# Patient Record
Sex: FEMALE | Race: WHITE
[De-identification: names, ages, dates, MRNs, and addresses within clinical notes are randomized per-mention and may not be internally consistent; named-entity substitution may affect disease eponyms.]

---

## 2017-01-04 ENCOUNTER — HOSPITAL ENCOUNTER (INPATIENT)
Dept: HOSPITAL 62 - ER | Age: 57
LOS: 6 days | Discharge: HOME | DRG: 309 | End: 2017-01-10
Attending: INTERNAL MEDICINE | Admitting: INTERNAL MEDICINE
Payer: MEDICARE

## 2017-01-04 DIAGNOSIS — Z90.49: ICD-10-CM

## 2017-01-04 DIAGNOSIS — B19.20: ICD-10-CM

## 2017-01-04 DIAGNOSIS — C19: ICD-10-CM

## 2017-01-04 DIAGNOSIS — M54.9: ICD-10-CM

## 2017-01-04 DIAGNOSIS — Z79.82: ICD-10-CM

## 2017-01-04 DIAGNOSIS — I25.10: ICD-10-CM

## 2017-01-04 DIAGNOSIS — E89.0: ICD-10-CM

## 2017-01-04 DIAGNOSIS — L27.1: ICD-10-CM

## 2017-01-04 DIAGNOSIS — Z90.710: ICD-10-CM

## 2017-01-04 DIAGNOSIS — I12.9: ICD-10-CM

## 2017-01-04 DIAGNOSIS — J45.909: ICD-10-CM

## 2017-01-04 DIAGNOSIS — N18.3: ICD-10-CM

## 2017-01-04 DIAGNOSIS — Z88.1: ICD-10-CM

## 2017-01-04 DIAGNOSIS — Z79.899: ICD-10-CM

## 2017-01-04 DIAGNOSIS — E86.0: ICD-10-CM

## 2017-01-04 DIAGNOSIS — E78.00: ICD-10-CM

## 2017-01-04 DIAGNOSIS — J44.1: ICD-10-CM

## 2017-01-04 DIAGNOSIS — F17.210: ICD-10-CM

## 2017-01-04 DIAGNOSIS — M10.9: ICD-10-CM

## 2017-01-04 DIAGNOSIS — I47.1: Primary | ICD-10-CM

## 2017-01-04 DIAGNOSIS — Z85.048: ICD-10-CM

## 2017-01-04 DIAGNOSIS — Z88.3: ICD-10-CM

## 2017-01-04 DIAGNOSIS — Z88.0: ICD-10-CM

## 2017-01-04 DIAGNOSIS — M19.90: ICD-10-CM

## 2017-01-04 DIAGNOSIS — Z96.642: ICD-10-CM

## 2017-01-04 DIAGNOSIS — T36.0X5A: ICD-10-CM

## 2017-01-04 DIAGNOSIS — G40.909: ICD-10-CM

## 2017-01-04 DIAGNOSIS — K57.92: ICD-10-CM

## 2017-01-04 DIAGNOSIS — N17.9: ICD-10-CM

## 2017-01-04 PROCEDURE — 96376 TX/PRO/DX INJ SAME DRUG ADON: CPT

## 2017-01-04 PROCEDURE — 81001 URINALYSIS AUTO W/SCOPE: CPT

## 2017-01-04 PROCEDURE — 99292 CRITICAL CARE ADDL 30 MIN: CPT

## 2017-01-04 PROCEDURE — 84484 ASSAY OF TROPONIN QUANT: CPT

## 2017-01-04 PROCEDURE — 84439 ASSAY OF FREE THYROXINE: CPT

## 2017-01-04 PROCEDURE — 96374 THER/PROPH/DIAG INJ IV PUSH: CPT

## 2017-01-04 PROCEDURE — 36415 COLL VENOUS BLD VENIPUNCTURE: CPT

## 2017-01-04 PROCEDURE — 80053 COMPREHEN METABOLIC PANEL: CPT

## 2017-01-04 PROCEDURE — 84481 FREE ASSAY (FT-3): CPT

## 2017-01-04 PROCEDURE — 76380 CAT SCAN FOLLOW-UP STUDY: CPT

## 2017-01-04 PROCEDURE — 84443 ASSAY THYROID STIM HORMONE: CPT

## 2017-01-04 PROCEDURE — 87804 INFLUENZA ASSAY W/OPTIC: CPT

## 2017-01-04 PROCEDURE — 96361 HYDRATE IV INFUSION ADD-ON: CPT

## 2017-01-04 PROCEDURE — 90686 IIV4 VACC NO PRSV 0.5 ML IM: CPT

## 2017-01-04 PROCEDURE — 82553 CREATINE MB FRACTION: CPT

## 2017-01-04 PROCEDURE — 83735 ASSAY OF MAGNESIUM: CPT

## 2017-01-04 PROCEDURE — 80048 BASIC METABOLIC PNL TOTAL CA: CPT

## 2017-01-04 PROCEDURE — 96375 TX/PRO/DX INJ NEW DRUG ADDON: CPT

## 2017-01-04 PROCEDURE — 87040 BLOOD CULTURE FOR BACTERIA: CPT

## 2017-01-04 PROCEDURE — 93010 ELECTROCARDIOGRAM REPORT: CPT

## 2017-01-04 PROCEDURE — 71010: CPT

## 2017-01-04 PROCEDURE — 51701 INSERT BLADDER CATHETER: CPT

## 2017-01-04 PROCEDURE — 80307 DRUG TEST PRSMV CHEM ANLYZR: CPT

## 2017-01-04 PROCEDURE — 87086 URINE CULTURE/COLONY COUNT: CPT

## 2017-01-04 PROCEDURE — 82550 ASSAY OF CK (CPK): CPT

## 2017-01-04 PROCEDURE — 99291 CRITICAL CARE FIRST HOUR: CPT

## 2017-01-04 PROCEDURE — 93005 ELECTROCARDIOGRAM TRACING: CPT

## 2017-01-04 PROCEDURE — 85025 COMPLETE CBC W/AUTO DIFF WBC: CPT

## 2017-01-04 PROCEDURE — 94640 AIRWAY INHALATION TREATMENT: CPT

## 2017-01-05 LAB
ALBUMIN SERPL-MCNC: 4.8 G/DL (ref 3.5–5)
ALP SERPL-CCNC: 92 U/L (ref 38–126)
ALT SERPL-CCNC: 46 U/L (ref 9–52)
ANION GAP SERPL CALC-SCNC: 19 MMOL/L (ref 5–19)
APPEARANCE UR: (no result)
AST SERPL-CCNC: 29 U/L (ref 14–36)
BARBITURATES UR QL SCN: NEGATIVE
BASOPHILS # BLD AUTO: 0.1 10^3/UL (ref 0–0.2)
BASOPHILS NFR BLD AUTO: 0.4 % (ref 0–2)
BILIRUB DIRECT SERPL-MCNC: 0 MG/DL (ref 0–0.3)
BILIRUB SERPL-MCNC: 0.6 MG/DL (ref 0.2–1.3)
BILIRUB UR QL STRIP: NEGATIVE
BUN SERPL-MCNC: 21 MG/DL (ref 7–20)
CALCIUM: 10.4 MG/DL (ref 8.4–10.2)
CHLORIDE SERPL-SCNC: 99 MMOL/L (ref 98–107)
CK MB SERPL-MCNC: 4.47 NG/ML (ref ?–4.55)
CK MB SERPL-MCNC: 5.01 NG/ML (ref ?–4.55)
CK MB SERPL-MCNC: 5.05 NG/ML (ref ?–4.55)
CO2 SERPL-SCNC: 26 MMOL/L (ref 22–30)
CREAT SERPL-MCNC: 1.71 MG/DL (ref 0.52–1.25)
EOSINOPHIL # BLD AUTO: 0 10^3/UL (ref 0–0.6)
EOSINOPHIL NFR BLD AUTO: 0.1 % (ref 0–6)
ERYTHROCYTE [DISTWIDTH] IN BLOOD BY AUTOMATED COUNT: 14.4 % (ref 11.5–14)
GLUCOSE SERPL-MCNC: 125 MG/DL (ref 75–110)
GLUCOSE UR STRIP-MCNC: 50 MG/DL
HCT VFR BLD CALC: 49.1 % (ref 36–47)
HGB BLD-MCNC: 17.3 G/DL (ref 12–15.5)
HGB HCT DIFFERENCE: 2.8
KETONES UR STRIP-MCNC: (no result) MG/DL
LYMPHOCYTES # BLD AUTO: 0.9 10^3/UL (ref 0.5–4.7)
LYMPHOCYTES NFR BLD AUTO: 7.1 % (ref 13–45)
MCH RBC QN AUTO: 34 PG (ref 27–33.4)
MCHC RBC AUTO-ENTMCNC: 35.2 G/DL (ref 32–36)
MCV RBC AUTO: 97 FL (ref 80–97)
METHADONE UR QL SCN: NEGATIVE
MONOCYTES # BLD AUTO: 0.5 10^3/UL (ref 0.1–1.4)
MONOCYTES NFR BLD AUTO: 3.7 % (ref 3–13)
NEUTROPHILS # BLD AUTO: 10.9 10^3/UL (ref 1.7–8.2)
NEUTS SEG NFR BLD AUTO: 88.7 % (ref 42–78)
NITRITE UR QL STRIP: NEGATIVE
PCP UR QL SCN: NEGATIVE
PH UR STRIP: 8 [PH] (ref 5–9)
POTASSIUM SERPL-SCNC: 3.8 MMOL/L (ref 3.6–5)
PROT SERPL-MCNC: 8.6 G/DL (ref 6.3–8.2)
PROT UR STRIP-MCNC: 100 MG/DL
RBC # BLD AUTO: 5.08 10^6/UL (ref 3.72–5.28)
SODIUM SERPL-SCNC: 144.3 MMOL/L (ref 137–145)
SP GR UR STRIP: 1.01
T3FREE SERPL-MCNC: 2.33 PG/ML (ref 2.77–5.27)
TROPONIN I SERPL-MCNC: 0.09 NG/ML
TROPONIN I SERPL-MCNC: 0.11 NG/ML
TROPONIN I SERPL-MCNC: 0.11 NG/ML
TSH SERPL-ACNC: 35.7 UIU/ML (ref 0.47–4.68)
UROBILINOGEN UR-MCNC: NEGATIVE MG/DL (ref ?–2)
WBC # BLD AUTO: 12.3 10^3/UL (ref 4–10.5)

## 2017-01-05 PROCEDURE — 5A2204Z RESTORATION OF CARDIAC RHYTHM, SINGLE: ICD-10-PCS | Performed by: EMERGENCY MEDICINE

## 2017-01-05 RX ADMIN — CLINDAMYCIN PHOSPHATE SCH ML: 12 INJECTION, SOLUTION INTRAVENOUS at 14:06

## 2017-01-05 RX ADMIN — OXYCODONE HYDROCHLORIDE PRN MG: 5 TABLET ORAL at 23:49

## 2017-01-05 RX ADMIN — AMIODARONE HYDROCHLORIDE SCH MG: 200 TABLET ORAL at 09:56

## 2017-01-05 RX ADMIN — METOPROLOL TARTRATE SCH MG: 25 TABLET, FILM COATED ORAL at 18:24

## 2017-01-05 RX ADMIN — IMIPENEM AND CILASTATIN SODIUM SCH MG: 500; 500 INJECTION, POWDER, FOR SOLUTION INTRAVENOUS at 11:40

## 2017-01-05 RX ADMIN — LISINOPRIL SCH MG: 5 TABLET ORAL at 09:57

## 2017-01-05 RX ADMIN — AMIODARONE HYDROCHLORIDE SCH MG: 200 TABLET ORAL at 21:55

## 2017-01-05 RX ADMIN — DOCUSATE SODIUM SCH MG: 100 CAPSULE, LIQUID FILLED ORAL at 18:11

## 2017-01-05 RX ADMIN — IMIPENEM AND CILASTATIN SODIUM SCH MG: 500; 500 INJECTION, POWDER, FOR SOLUTION INTRAVENOUS at 23:50

## 2017-01-05 RX ADMIN — DOCUSATE SODIUM SCH MG: 100 CAPSULE, LIQUID FILLED ORAL at 09:56

## 2017-01-05 RX ADMIN — METOPROLOL TARTRATE SCH MG: 25 TABLET, FILM COATED ORAL at 09:21

## 2017-01-05 RX ADMIN — OXYCODONE HYDROCHLORIDE PRN MG: 5 TABLET ORAL at 07:40

## 2017-01-05 RX ADMIN — HEPARIN SODIUM SCH UNIT: 5000 INJECTION, SOLUTION INTRAVENOUS; SUBCUTANEOUS at 21:56

## 2017-01-05 RX ADMIN — IPRATROPIUM BROMIDE AND ALBUTEROL SULFATE PRN ML: 2.5; .5 SOLUTION RESPIRATORY (INHALATION) at 23:53

## 2017-01-05 RX ADMIN — IMIPENEM AND CILASTATIN SODIUM SCH MG: 500; 500 INJECTION, POWDER, FOR SOLUTION INTRAVENOUS at 18:24

## 2017-01-05 RX ADMIN — CLINDAMYCIN PHOSPHATE SCH ML: 12 INJECTION, SOLUTION INTRAVENOUS at 21:54

## 2017-01-05 RX ADMIN — OXYCODONE HYDROCHLORIDE PRN MG: 5 TABLET ORAL at 16:32

## 2017-01-05 RX ADMIN — ATORVASTATIN CALCIUM SCH MG: 20 TABLET, FILM COATED ORAL at 21:54

## 2017-01-05 RX ADMIN — LISINOPRIL SCH MG: 5 TABLET ORAL at 21:55

## 2017-01-05 RX ADMIN — HEPARIN SODIUM SCH UNIT: 5000 INJECTION, SOLUTION INTRAVENOUS; SUBCUTANEOUS at 14:08

## 2017-01-05 NOTE — PDOC H&P
History of Present Illness


Admission Date/PCP: 


  01/05/17 05:03





  KEVAN MERA, 





Patient complains of: Nausea vomiting abdominal pain


History of Present Illness: 


KAIT QUARLES is a 56 year old female with a past medical history of COPD, 

seizure disorder, hypertension, hepatitis C, SVT, and ongoing tobacco 

dependence.  Who had been her usual state of health until approximately 12 days 

ago diagnosed with urinary tract infection started on empiric antibiotics 

without residual symptoms though had severe constipation for 12 days, and the 

last 24 hours having several episodes of diarrhea and vomiting 10 of dark 

malodorous material.  In the emergency room she is found to have hypothermia, 

unstable hypotension with tachycardia in the 200s and AVNRT receiving 6 mg, 12 

mg, 18 mg of adenosine without conversion, prompting unsuccessful cardioversion 

3 followed by amiodarone loading and cardioversion to normal sinus rhythm.  

She otherwise is found to have acute renal failure and leukocytosis started on 

empiric antibiotics and referred to the hospitalist for admission.





Past Medical History


Cardiac Medical History: Reports: Coronary Artery Disease, Hyperlipidema, 

Hypertension, Other - AVNRT


   Denies: Myocardial Infarction, Pulmonary Embolism


Pulmonary Medical History: Reports: Asthma, Chronic Obstructive Pulmonary 

Disease (COPD)


   Denies: Bronchitis, Pneumonia


Neurological Medical History: Reports: Seizures


Endocrine Medical History: Reports: Hypothyroidism - S/P thyroidectomy


Malignancy Medical History: Reports: Colorectal Cancer


GI Medical History: Reports: Hepatitis - Hep C


Musculoskeltal Medical History: Reports: Arthritis - gout


Psychiatric Medical History: Reports: Tobacco Dependency


Hematology: 


   Denies: Anemia


Infectious Medical History: Reports: Hepatitis C





Past Surgical History


Past Surgical History: Reports: Appendectomy, Carotid Endarterectomy, 

Orthopedic Surgery - L hip replacement 8/2012


   Denies: Hysterectomy





Social History


Information Source: Patient


Lives with: Family, Spouse/Significant other


Smoking Status: Current Every Day Smoker


Frequency of Alcohol Use: None


Hx Recreational Drug Use: No


Drugs: Marijuana


Hx Prescription Drug Abuse: No





- Advance Directive


Resuscitation Status: Full Code





Family History


Family History: COPD, Hypertension


Parental Family History Reviewed: Yes


Children Family History Reviewed: Yes


Sibling(s) Family History Reviewed.: Yes





Medication/Allergy


Home Medications: 








Albuterol Sulfate [Proair HFA] 2 puff IH Q4 PRN 12/18/15 


Aspirin [Aspirin 81 mg Chewable Tablet] 81 mg PO DAILY 12/18/15 


Atorvastatin Calcium 20 mg PO QHS 12/18/15 


Levothyroxine Sodium 150 mcg PO QAM 12/18/15 


Metoprolol Tartrate 12.5 mg PO BID 12/18/15 


Lisinopril [Prinivil 5 mg Tablet] 5 mg PO Q12 #60 tablet 12/20/15 


Oxycodone HCl/Acetaminophen [Percocet 5-325 mg Tablet] 1 tab PO Q4HP PRN #20 

tablet 12/20/15 


Lidocaine [Lidoderm 5% (700 mg) Transdermal Patch] 1 patch TP DAILY #10 

adh..patch 12/21/16 


Nitrofurantoin Monohyd/M-Cryst [Macrobid 100 mg Capsule] 100 mg PO BID #10 

capsule 12/21/16 


Oxycodone HCl 5 mg PO Q6 #20 tablet 12/21/16 








Allergies/Adverse Reactions: 


 





ciprofloxacin [From Cipro] Allergy (Severe, Verified 12/30/16 08:46)


 


ciprofloxacin HCl [From Cipro] Allergy (Severe, Verified 12/30/16 08:46)


 


Penicillins Allergy (Severe, Verified 12/30/16 08:46)


 


cephalexin monohydrate [From Keflex] Adverse Reaction (Verified 12/30/16 08:46)


 











Review of Systems


Constitutional: PRESENT: anorexia, chills, fatigue, fever(s), weakness


Ears: ABSENT: hearing changes


Cardiovascular: ABSENT: chest pain, dyspnea on exertion, edema, orthropnea, 

palpitations


Respiratory: PRESENT: cough.  ABSENT: dyspnea, hemoptysis, sputum


Gastrointestinal: PRESENT: abdominal pain, bloating, constipation, diarrhea, 

nausea, vomiting.  ABSENT: dysphagia, heartburn, hematemesis, hematochezia


Genitourinary: ABSENT: dysuria, hematuria


Musculoskeletal: ABSENT: joint swelling


Integumentary: ABSENT: rash, wounds


Neurological: ABSENT: abnormal gait, abnormal speech, confusion, dizziness, 

focal weakness, syncope


Psychiatric: ABSENT: anxiety, depression, homidical ideation, suicidal ideation


Endocrine: ABSENT: cold intolerance, heat intolerance, polydipsia, polyuria


Hematologic/Lymphatic: ABSENT: easy bleeding, easy bruising





Physical Exam


Vital Signs: 


 











Temp Pulse Resp BP Pulse Ox


 


 97.6 F   93   15   192/117 H  96 


 


 01/05/17 03:29  01/05/17 03:29  01/05/17 03:29  01/05/17 03:29  01/05/17 03:29











General appearance: PRESENT: cooperative, mild distress, thin


Head exam: PRESENT: atraumatic, normocephalic


Eye exam: PRESENT: conjunctiva pink, EOMI, PERRLA.  ABSENT: scleral icterus


Ear exam: PRESENT: normal external ear exam


Mouth exam: PRESENT: dry mucosa, tongue midline


Neck exam: ABSENT: carotid bruit, JVD, lymphadenopathy, thyromegaly


Respiratory exam: PRESENT: accessory muscle use, prolonged expiratory phas, 

symmetrical, tachypnea, wheezes.  ABSENT: crackles, decreased breath sounds, 

rales, retraction


Cardiovascular exam: PRESENT: RRR, +S1, +S2.  ABSENT: clicks, diastolic murmur, 

gallop


Pulses: PRESENT: normal dorsalis pedis pul


Vascular exam: PRESENT: normal capillary refill


GI/Abdominal exam: PRESENT: diminished bowel sounds, distended, hypoactive 

bowel sounds, soft, tenderness.  ABSENT: firm, guarding, Sethi's sign, normal 

bowel sounds, organolmegaly, rebound, rigid


Rectal exam: PRESENT: deferred


Extremities exam: PRESENT: full ROM.  ABSENT: calf tenderness, clubbing, pedal 

edema


Neurological exam: PRESENT: alert, awake, oriented to person, oriented to place

, oriented to time, oriented to situation, CN II-XII grossly intact.  ABSENT: 

motor sensory deficit


Psychiatric exam: PRESENT: appropriate affect, normal mood.  ABSENT: homicidal 

ideation, suicidal ideation


Skin exam: PRESENT: dry, intact, warm.  ABSENT: cyanosis, rash





Results


Impressions: 


 





Chest X-Ray  01/05/17 01:05


IMPRESSION:  NO ACUTE RADIOGRAPHIC FINDING IN THE CHEST.


 








Limited or Localized CT  01/05/17 03:21


IMPRESSION:  CHRONIC ATROPHIC LEFT KIDNEY WITH STABLE CORTICAL CYSTS.  NO 

SIGNIFICANT OR ACUTE PROCESS IN THE ABDOMEN OR PELVIS.


 














Assessment & Plan





- Diagnosis


(1) Acute diverticulitis


Is this a current diagnosis for this admission?: YesPlan: 





Diagnosed clinically given symptomology and history of Greater than 10 days of 

constipation and history of diverticulitis.  She receive IV fluids remain 

nothing by mouth empiric antibiotics and symptomatically management with 

reevaluation of labs.  Additional CT imaging of abdomen and pelvis with by 

mouth contrast if not improved.








(2) Sepsis





Is this a current diagnosis for this admission?: YesPlan: 


Severe sepsis with hypotension and hypothermia receiving IV fluid challenge 

when necessary pressors.  Given extensive antibiotic allergies she is ordered 

imipenem and clindamycin otherwise following clinically and repeat labs








(3) Acute renal failure





Is this a current diagnosis for this admission?: YesPlan: 


Secondary to hypotension and severe sepsis avoiding nephrotoxic meds and doses 

reevaluate chemistry








(4) AVNRT (AV denisa re-entry tachycardia)


Is this a current diagnosis for this admission?: YesPlan: 


Patient required adenosine followed by cardioversion and ultimately amiodarone 

for conversion.  I'll continue amiodarone and Lopressor with serial cardiac 

enzymes every 8 hours and cardiology consultation.








(5) COPD exacerbation


Is this a current diagnosis for this admission?: YesPlan: 


Incentive spirometry with albuterol Atrovent











- Time


Time Spent: 50 to 70 Minutes

## 2017-01-05 NOTE — ER DOCUMENT REPORT
ED General





- General


Chief Complaint: Nausea/Vomiting


Stated Complaint: LOW BODY TEMPERATURE


Cannot obtain history due to: Unstable vital signs


Notes: 


Patient is a 56-year-old female has a history of hypothyroidism secondary to 

thyroidectomy, anal cancer in remission, no active chemotherapy, who presents 

with hypothermia, vomiting, and generalized myalgias.  States that her symptoms 

beginning progressively worse with the day.  Denies a history of similar 

symptoms in the past.  No sick contacts.  Denies any shortness of breath but 

has had cough and bilateral flank pain.  She states she was treated for urinary 

tract infection several weeks ago but has not noted any improvement in her 

flank pain since that time.  The pain is described as a dull, throbbing pain 

that is not improved or worsened by anything.  She has not seen her primary 

care physician regarding today's concerns.  Has a history of similar symptoms 

in the past.  Of note, patient does have a known history of AVNRT.


TRAVEL OUTSIDE OF THE [place] IN LAST 30 DAYS: No





- Related Data


Allergies/Adverse Reactions: 


 





ciprofloxacin [From Cipro] Allergy (Severe, Verified 12/30/16 08:46)


 


Penicillins Allergy (Severe, Verified 12/30/16 08:46)


 


cephalexin monohydrate [From Keflex] Adverse Reaction (Verified 12/30/16 08:46)


 








Home Medications: 


 Current Home Medications





Aspirin [Aspirin 81 mg Chewable Tablet] 81 mg PO DAILY 01/05/17 [History]


Atorvastatin Calcium 20 mg PO QHS 01/05/17 [History]


Levothyroxine Sodium [Synthroid 0.15 mg Tablet] 150 mcg PO QAM 01/05/17 [History

]


Losartan Potassium 50 mg PO DAILY 01/05/17 [History]


Metoprolol Tartrate 25 mg PO BID 01/05/17 [History]











Past Medical History





- General


Information source: Patient





- Social History


Smoking Status: Current Every Day Smoker


Chew tobacco use (# tins/day): No


Frequency of alcohol use: None


Drug Abuse: None


Lives with: Spouse/Significant other


Family History: Reviewed & Not Pertinent


Patient has suicidal ideation: No


Patient has homicidal ideation: No





- Past Medical History


Cardiac Medical History: Reports: Hx Coronary Artery Disease, Hx 

Hypercholesterolemia, Hx Hypertension


   Denies: Hx Heart Attack, Hx Pulmonary Embolism


Pulmonary Medical History: Reports: Hx Asthma


   Denies: Hx Bronchitis, Hx COPD, Hx Pneumonia


Neurological Medical History: Reports: Hx Seizures.  Denies: Hx Cerebrovascular 

Accident


Endocrine Medical History: Reports: Hx Hypothyroidism - S/P thyroidectomy


Malignancy Medical History: Reports: Hx Colorectal Cancer


GI Medical History: Reports: Hx Hepatitis - Hep C


Musculoskeltal Medical History: Reports Hx Arthritis - gout


Infectious Medical History: Reports: Hx Hepatitis - Hep C


Past Surgical History: Reports: Hx Appendectomy, Hx Carotid Endarterectomy, Hx 

Orthopedic Surgery - L hip replacement 8/2012, Hx Thyroid Surgery.  Denies: Hx 

Hysterectomy





- Immunizations


Immunizations up to date: Yes


Hx Diphtheria, Pertussis, Tetanus Vaccination: Yes


Hx Pneumococcal Vaccination: 01/01/10





Review of Systems





- Review of Systems


Notes: 


Constitutional: Negative for fever.  Positive for hypothermia


HENT: Negative for sore throat.


Eyes: Negative for visual changes.


Cardiovascular: Negative for chest pain.


Respiratory: Negative for shortness of breath.


Gastrointestinal: Negative for abdominal pain, positive for vomiting


Genitourinary: Negative for dysuria.


Musculoskeletal: Negative for back pain.  Positive for bilateral flank pain


Skin: Negative for rash.


Neurological: Negative for headaches, weakness or numbness.





10 point ROS negative except as marked above and in HPI.





Physical Exam





- Vital signs


Vitals: 


 











Temp Pulse Resp BP Pulse Ox


 


 94.4 F L  72   20   148/47 H  100 


 


 01/04/17 22:19  01/04/17 22:19  01/04/17 22:19  01/04/17 22:19  01/04/17 22:19











Interpretation: Hypertensive


Notes: 


PHYSICAL EXAMINATION:





GENERAL: Frail, ill in appearance.





HEAD: Atraumatic, normocephalic.





EYES: Pupils equal round and reactive to light, extraocular movements intact, 

sclera anicteric, conjunctiva are normal.





ENT: nares patent, oropharynx clear without exudates.  Dry mucous membranes.





NECK: Normal range of motion, supple without lymphadenopathy





LUNGS: Breath sounds clear to auscultation bilaterally and equal.  No wheezes 

rales or rhonchi.





HEART: Regular tachycardia without murmurs





ABDOMEN: Soft, nontender, normoactive bowel sounds.  No guarding, no rebound.  

No masses appreciated.





EXTREMITIES: Normal range of motion, no pitting or edema.  No cyanosis.





NEUROLOGICAL: No focal neurological deficits. Moves all extremities 

spontaneously and on command.





PSYCH: Normal mood, normal affect.





SKIN: Warm, diaphoretic, normal turgor, no rashes or lesions noted.





Course





- Re-evaluation


Re-evalutation: 


01/05/17 01:07


I went to assess the patient shortly after picking up her chart after the nurse 

informing of the patient had entered into severe tachycardia with a consistent 

rate of 198.  Immediate evaluation the bedside showed an awake, talking patient 

who appeared ill but in no acute distress.  Blood pressure was systolics in the 

150s.  IV access was established.  An attempt was made to use adenosine 6 mg 

and patient showed virtually no pause or response to this dosing.  The patient 

became increasingly diaphoretic and noted severe nausea.  Her pressure was also 

noted to begin to tilt downward 40 points after initial adenosine dosing.  At 

this juncture, I elected to proceed with electrical cardioversion.  100 mg of 

IV ketamine was given and a initial defibrillation at 100 J was attempted with 

no response.  2 subsequent 200 J defibrillations were tried without any 

response.  Patient's blood pressure remained at 110-120 systolic.  At the 

structure we obtained IV access directly in the shoulder for a more proximal 

site for adenosine infusion.  A 12 mg adenosine bolus was given. Patient did 

have a pause this time but rapidly went back and SVT.  At this point we moved 

to give 18 mg of adenosine.  This did successfully cardiovert the patient.  She 

is now normal sinus tachycardia.  Of note, patient was permitted time of 

arrival with a temp 94.4.  A sepsis workup will be initiated.





01/05/17 01:38


Patient has returned to intermittent periods of AVNRT with intermittent beats 

of ventricular tachycardia.  Her pressure is now on 80 systolic.  The pads of 

been moved to an AP posterior position.  Given that she intermittently goes 

back to normal sinus rhythm, I will trial a loading dose of amiodarone followed 

by an amiodarone infusion.





01/05/17 02:48


Patient continued to have very intermittent periods of AVNRT approximately 5-10 

beats and then converts back into normal sinus rhythm on amiodarone.  Her blood 

pressures have remained within acceptable limits at this time.  She remains 

somewhat ill in appearance but in no acute distress.  Patient has significant 

antibiotic allergies so I have started her on clindamycin, aztreonam, and 

vancomycin given her hypothermia and leukocytosis.





01/05/17 04:37


Patient has had 2 episodes of nonbilious vomiting.  She has also now become 

hypertensive but is having significantly reduced frequency of runs of AVNRT.  A 

repeat EKG will be obtained at this time to ensure there is no ST changes but 

again patient denies any chest pain or shortness of breath suggest ACS as 

etiology of her vomiting.  I discussed this case with Dr. Amos the 

hospitalist who requested a noncontrasted CT scan of the abdomen and pelvis 

given her elevated creatinine to ensure that there is no evidence of a distal 

obstruction.  This was unremarkable without any acute findings.  Patient has 

been given additional Zofran and will be given a dose of oral hydralazine.





- Vital Signs


Vital signs: 


 











Temp Pulse Resp BP Pulse Ox


 


 98.8 F   71   16   135/69 H  98 


 


 01/07/17 00:36  01/07/17 02:00  01/07/17 00:36  01/07/17 00:36  01/07/17 00:36














- Laboratory


Result Diagrams: 


 01/06/17 05:30





 01/06/17 05:30


Laboratory results interpreted by me: 


 











  01/05/17 01/05/17 01/05/17





  02:10 02:10 02:10


 


WBC  12.3 H  


 


Hgb  17.3 H  


 


Hct  49.1 H  


 


MCH  34.0 H  


 


RDW  14.4 H  


 


Seg Neutrophils %  88.7 H  


 


Lymphocytes %  7.1 L  


 


Absolute Neutrophils  10.9 H  


 


BUN   21 H 


 


Creatinine   1.71 H 


 


Est GFR ( Amer)   37 L 


 


Est GFR (Non-Af Amer)   31 L 


 


Glucose   125 H 


 


Calcium   10.4 H 


 


Total Protein   8.6 H 


 


TSH    35.70 H


 


Free T3 pg/mL    2.33 L


 


Urine Protein   


 


Urine Glucose (UA)   


 


Urine Ketones   


 


Urine Blood   














  01/05/17





  03:16


 


WBC 


 


Hgb 


 


Hct 


 


MCH 


 


RDW 


 


Seg Neutrophils % 


 


Lymphocytes % 


 


Absolute Neutrophils 


 


BUN 


 


Creatinine 


 


Est GFR ( Amer) 


 


Est GFR (Non-Af Amer) 


 


Glucose 


 


Calcium 


 


Total Protein 


 


TSH 


 


Free T3 pg/mL 


 


Urine Protein  100 H


 


Urine Glucose (UA)  50 H


 


Urine Ketones  TRACE H


 


Urine Blood  SMALL H














- Diagnostic Test


Radiology reviewed: Image reviewed, Reports reviewed


Radiology results interpreted by me: 





01/05/17 04:38


Chest x-ray: No acute infiltrate





- EKG Interpretation by Me


Additional EKG results interpreted by me: 





01/05/17 04:38


EKG 1: AVNRT rate 198.  No ST elevations or depressions.





EKG 2: Sinus tachycardia.  Rate 114.  No ST elevations or depressions.  .





Procedures





- Conscious Sedation


  ** Conscious sedation


Time started: 00:40


Time completed: 01:06


Consent obtained: No - emergent


Indication: cardioversion


Prior complications: Procedural sedation


Pt with a severe systemic disease.: P3. - ASA Classification.


Airway Evaluation: Normal anatomy


Mallampati Classification: Class 1


Used during procedure: Suction available, IV access obtained, Pulse ox on pt., 

Cardiac monitor on pt.


Medications administered: Ketamine


Reversal agents: None


I personally performed/intraservice time: Sedation, Procedure, 30 min or less


Complications: No





- Additional Procedures


  ** Cardioversion/Defib


Additional Procedures: Cardioversion/defib


Notes: 





01/05/17 01:06


3 attempts are made at electrocardioversion for supraventricular tachycardia 

that showed no response to 6 mg of adenosine.  Had no response to a 

synchronized conversion at 100 J 1 and 200 J 2





Critical Care Note





- Critical Care Note


Total time excluding time spent on procedures (mins): 85


Comments: 


Critical care time spent obtaining history from patient or surrogate, 

discussions with consultants, development of treatment plan with patient or 

surrogate, evaluation of patient's response to treatment, examination of patient

, ordering and performing treatments and interventions, ordering and review of 

laboratory studies, re-evaluation of patient's condition, ordering and review 

of radiographic studies and review of old charts





Discharge





- Discharge


Clinical Impression: 


 SVT (supraventricular tachycardia), Acute kidney injury superimposed on 

chronic kidney disease, SIRS (systemic inflammatory response syndrome)





Nausea & vomiting


Qualifiers:


 Vomiting type: unspecified Vomiting Intractability: non-intractable Qualified 

Code(s): R11.2 - Nausea with vomiting, unspecified





Hypothermia


Qualifiers:


 Encounter type: initial encounter Qualified Code(s): T68.XXXA - Hypothermia, 

initial encounter





Condition: Critical


Disposition: ADMITTED AS INPATIENT


Admitting Provider: Hospitalist - Dandre


Unit Admitted: ICU

## 2017-01-05 NOTE — EKG REPORT
SEVERITY:- ABNORMAL ECG -

SINUS RHYTHM

FIRST DEGREE AV BLOCK

LOW VOLTAGE WITH RIGHT AXIS DEVIATION

CONSIDER LEFT VENTRICULAR HYPERTROPHY

BORDERLINE PROLONGED QT INTERVAL

:

Confirmed by: Dianne Quintero 05-Jan-2017 07:54:02

## 2017-01-06 LAB
ANION GAP SERPL CALC-SCNC: 10 MMOL/L (ref 5–19)
ANION GAP SERPL CALC-SCNC: 12 MMOL/L (ref 5–19)
BASOPHILS # BLD AUTO: 0 10^3/UL (ref 0–0.2)
BASOPHILS NFR BLD AUTO: 0.5 % (ref 0–2)
BUN SERPL-MCNC: 14 MG/DL (ref 7–20)
BUN SERPL-MCNC: 16 MG/DL (ref 7–20)
CALCIUM: 8.2 MG/DL (ref 8.4–10.2)
CALCIUM: 8.4 MG/DL (ref 8.4–10.2)
CHLORIDE SERPL-SCNC: 104 MMOL/L (ref 98–107)
CHLORIDE SERPL-SCNC: 106 MMOL/L (ref 98–107)
CK MB SERPL-MCNC: 2.15 NG/ML (ref ?–4.55)
CK MB SERPL-MCNC: 2.87 NG/ML (ref ?–4.55)
CK MB SERPL-MCNC: 3.2 NG/ML (ref ?–4.55)
CO2 SERPL-SCNC: 23 MMOL/L (ref 22–30)
CO2 SERPL-SCNC: 24 MMOL/L (ref 22–30)
CREAT SERPL-MCNC: 1.28 MG/DL (ref 0.52–1.25)
CREAT SERPL-MCNC: 1.29 MG/DL (ref 0.52–1.25)
EOSINOPHIL # BLD AUTO: 0.1 10^3/UL (ref 0–0.6)
EOSINOPHIL NFR BLD AUTO: 2.1 % (ref 0–6)
ERYTHROCYTE [DISTWIDTH] IN BLOOD BY AUTOMATED COUNT: 14.7 % (ref 11.5–14)
GLUCOSE SERPL-MCNC: 76 MG/DL (ref 75–110)
GLUCOSE SERPL-MCNC: 93 MG/DL (ref 75–110)
HCT VFR BLD CALC: 36.7 % (ref 36–47)
HGB BLD-MCNC: 12.7 G/DL (ref 12–15.5)
HGB HCT DIFFERENCE: 1.4
LYMPHOCYTES # BLD AUTO: 0.8 10^3/UL (ref 0.5–4.7)
LYMPHOCYTES NFR BLD AUTO: 11 % (ref 13–45)
MAGNESIUM SERPL-MCNC: 1.6 MG/DL (ref 1.6–2.3)
MCH RBC QN AUTO: 34.1 PG (ref 27–33.4)
MCHC RBC AUTO-ENTMCNC: 34.5 G/DL (ref 32–36)
MCV RBC AUTO: 99 FL (ref 80–97)
MONOCYTES # BLD AUTO: 0.3 10^3/UL (ref 0.1–1.4)
MONOCYTES NFR BLD AUTO: 4.7 % (ref 3–13)
NEUTROPHILS # BLD AUTO: 5.6 10^3/UL (ref 1.7–8.2)
NEUTS SEG NFR BLD AUTO: 81.7 % (ref 42–78)
POTASSIUM SERPL-SCNC: 3.2 MMOL/L (ref 3.6–5)
POTASSIUM SERPL-SCNC: 3.7 MMOL/L (ref 3.6–5)
RBC # BLD AUTO: 3.71 10^6/UL (ref 3.72–5.28)
SODIUM SERPL-SCNC: 139.1 MMOL/L (ref 137–145)
SODIUM SERPL-SCNC: 139.5 MMOL/L (ref 137–145)
TROPONIN I SERPL-MCNC: 0.02 NG/ML
TROPONIN I SERPL-MCNC: 0.03 NG/ML
TROPONIN I SERPL-MCNC: 0.05 NG/ML
WBC # BLD AUTO: 6.9 10^3/UL (ref 4–10.5)

## 2017-01-06 RX ADMIN — CLINDAMYCIN PHOSPHATE SCH ML: 12 INJECTION, SOLUTION INTRAVENOUS at 05:22

## 2017-01-06 RX ADMIN — PREDNISONE SCH MG: 20 TABLET ORAL at 10:03

## 2017-01-06 RX ADMIN — METOPROLOL TARTRATE SCH MG: 25 TABLET, FILM COATED ORAL at 06:09

## 2017-01-06 RX ADMIN — OXYCODONE HYDROCHLORIDE PRN MG: 5 TABLET ORAL at 21:38

## 2017-01-06 RX ADMIN — CLINDAMYCIN PHOSPHATE SCH ML: 12 INJECTION, SOLUTION INTRAVENOUS at 13:04

## 2017-01-06 RX ADMIN — HEPARIN SODIUM SCH UNIT: 5000 INJECTION, SOLUTION INTRAVENOUS; SUBCUTANEOUS at 13:05

## 2017-01-06 RX ADMIN — IMIPENEM AND CILASTATIN SODIUM SCH MG: 500; 500 INJECTION, POWDER, FOR SOLUTION INTRAVENOUS at 23:54

## 2017-01-06 RX ADMIN — LISINOPRIL SCH MG: 5 TABLET ORAL at 10:06

## 2017-01-06 RX ADMIN — HEPARIN SODIUM SCH UNIT: 5000 INJECTION, SOLUTION INTRAVENOUS; SUBCUTANEOUS at 21:29

## 2017-01-06 RX ADMIN — CLINDAMYCIN PHOSPHATE SCH ML: 12 INJECTION, SOLUTION INTRAVENOUS at 21:39

## 2017-01-06 RX ADMIN — ASPIRIN SCH MG: 81 TABLET, CHEWABLE ORAL at 10:03

## 2017-01-06 RX ADMIN — IPRATROPIUM BROMIDE AND ALBUTEROL SULFATE PRN ML: 2.5; .5 SOLUTION RESPIRATORY (INHALATION) at 12:29

## 2017-01-06 RX ADMIN — DOCUSATE SODIUM SCH MG: 100 CAPSULE, LIQUID FILLED ORAL at 10:06

## 2017-01-06 RX ADMIN — IMIPENEM AND CILASTATIN SODIUM SCH MG: 500; 500 INJECTION, POWDER, FOR SOLUTION INTRAVENOUS at 17:21

## 2017-01-06 RX ADMIN — DOCUSATE SODIUM SCH MG: 100 CAPSULE, LIQUID FILLED ORAL at 17:23

## 2017-01-06 RX ADMIN — IMIPENEM AND CILASTATIN SODIUM SCH MG: 500; 500 INJECTION, POWDER, FOR SOLUTION INTRAVENOUS at 11:45

## 2017-01-06 RX ADMIN — OXYCODONE HYDROCHLORIDE PRN MG: 5 TABLET ORAL at 06:16

## 2017-01-06 RX ADMIN — LEVOTHYROXINE SODIUM SCH MG: 150 TABLET ORAL at 05:23

## 2017-01-06 RX ADMIN — POTASSIUM CHLORIDE SCH ML: 29.8 INJECTION, SOLUTION INTRAVENOUS at 04:39

## 2017-01-06 RX ADMIN — OXYCODONE HYDROCHLORIDE PRN MG: 5 TABLET ORAL at 14:17

## 2017-01-06 RX ADMIN — IMIPENEM AND CILASTATIN SODIUM SCH MG: 500; 500 INJECTION, POWDER, FOR SOLUTION INTRAVENOUS at 06:11

## 2017-01-06 RX ADMIN — ATORVASTATIN CALCIUM SCH MG: 20 TABLET, FILM COATED ORAL at 21:39

## 2017-01-06 RX ADMIN — LISINOPRIL SCH MG: 5 TABLET ORAL at 21:39

## 2017-01-06 RX ADMIN — PREDNISONE SCH MG: 20 TABLET ORAL at 17:22

## 2017-01-06 RX ADMIN — HEPARIN SODIUM SCH UNIT: 5000 INJECTION, SOLUTION INTRAVENOUS; SUBCUTANEOUS at 05:22

## 2017-01-06 RX ADMIN — METOPROLOL TARTRATE SCH MG: 25 TABLET, FILM COATED ORAL at 17:22

## 2017-01-06 RX ADMIN — FLUTICASONE PROPIONATE SCH SPRAY: 50 SPRAY, METERED NASAL at 17:22

## 2017-01-06 RX ADMIN — POTASSIUM CHLORIDE SCH ML: 29.8 INJECTION, SOLUTION INTRAVENOUS at 01:45

## 2017-01-06 NOTE — EKG REPORT
SEVERITY:- ABNORMAL ECG -

SUPRAVENTRICULAR TACHYCARDIA

MARKEDLY POSTERIOR QRS AXIS

CONSIDER RIGHT VENTRICULAR HYPERTROPHY

CONSIDER LEFT VENTRICULAR HYPERTROPHY

ST DEPRESSION, PROBABLY RATE RELATED

:

Confirmed by: Dianne Quintero 06-Jan-2017 17:37:37

## 2017-01-06 NOTE — EKG REPORT
SEVERITY:- ABNORMAL ECG -

SINUS RHYTHM WITH SHORT RUN OF PAT

VENTRICULAR PREMATURE COMPLEXES

RIGHT AXIS DEVIATION

LOW VOLTAGE IN FRONTAL LEADS

CONSIDER LEFT VENTRICULAR HYPERTROPHY

:

Confirmed by: Dianne Quintero 06-Jan-2017 17:37:06

## 2017-01-06 NOTE — EKG REPORT
SEVERITY:- ABNORMAL ECG -

SINUS TACHYCARDIA

LEFT POSTERIOR FASCICULAR BLOCK

LOW VOLTAGE WITH RIGHT AXIS DEVIATION

CONSIDER LEFT VENTRICULAR HYPERTROPHY

:

Confirmed by: Dianne Quintero 06-Jan-2017 17:35:23

## 2017-01-06 NOTE — PDOC PROGRESS REPORT
Subjective


Progress Note for:: 01/06/17


Subjective:: 


The patient states to feel much better.  She denies any further shortness of 

breath or chest pain.  She denies any further palpitations.





Physical Exam


Vital Signs: 


 











Temp Pulse Resp BP Pulse Ox


 


 98.3 F   63   16   92/57 L  95 


 


 01/06/17 07:14  01/06/17 07:14  01/06/17 07:14  01/06/17 07:14  01/06/17 07:14








 Intake & Output











 01/05/17 01/06/17 01/07/17





 06:59 06:59 06:59


 


Intake Total  2086 


 


Output Total  1000 


 


Balance  1086 


 


Weight  50.1 kg 











General appearance: PRESENT: no acute distress


Head exam: PRESENT: atraumatic


Eye exam: PRESENT: conjunctival injection


Neck exam: ABSENT: carotid bruit, JVD


Respiratory exam: PRESENT: decreased breath sounds, rhonchi, wheezes


Cardiovascular exam: PRESENT: RRR, +S1, +S2


Pulses: PRESENT: +1 pedal pulses bilateral


Vascular exam: PRESENT: normal capillary refill


GI/Abdominal exam: PRESENT: normal bowel sounds, soft


Extremities exam: PRESENT: full ROM


Musculoskeletal exam: PRESENT: ambulatory


Neurological exam: PRESENT: alert, oriented to person, oriented to time





Results


Laboratory Results: 


 





 01/06/17 05:30 





 01/06/17 05:30 





 











  01/06/17 01/06/17 01/06/17





  00:44 00:44 05:30


 


WBC    6.9


 


RBC    3.71 L


 


Hgb    12.7  D


 


Hct    36.7


 


MCV    99 H


 


MCH    34.1 H


 


MCHC    34.5


 


RDW    14.7 H


 


Plt Count    133 L


 


Seg Neutrophils %    81.7 H


 


Lymphocytes %    11.0 L


 


Monocytes %    4.7


 


Eosinophils %    2.1


 


Basophils %    0.5


 


Absolute Neutrophils    5.6


 


Absolute Lymphocytes    0.8


 


Absolute Monocytes    0.3


 


Absolute Eosinophils    0.1


 


Absolute Basophils    0.0


 


Sodium  Cancelled  139.5 


 


Potassium  Cancelled  3.2 L 


 


Chloride  Cancelled  104 


 


Carbon Dioxide  Cancelled  24 


 


Anion Gap  Cancelled  12 


 


BUN  Cancelled  16 


 


Creatinine  Cancelled  1.28 H 


 


Est GFR ( Amer)  Cancelled  52 L 


 


Est GFR (Non-Af Amer)  Cancelled  43 L 


 


Glucose  Cancelled  93 


 


Calcium  Cancelled  8.4 


 


Magnesium  Cancelled  1.6 














  01/06/17





  05:30


 


WBC 


 


RBC 


 


Hgb 


 


Hct 


 


MCV 


 


MCH 


 


MCHC 


 


RDW 


 


Plt Count 


 


Seg Neutrophils % 


 


Lymphocytes % 


 


Monocytes % 


 


Eosinophils % 


 


Basophils % 


 


Absolute Neutrophils 


 


Absolute Lymphocytes 


 


Absolute Monocytes 


 


Absolute Eosinophils 


 


Absolute Basophils 


 


Sodium  139.1


 


Potassium  3.7


 


Chloride  106


 


Carbon Dioxide  23


 


Anion Gap  10


 


BUN  14


 


Creatinine  1.29 H


 


Est GFR ( Amer)  52 L


 


Est GFR (Non-Af Amer)  43 L


 


Glucose  76


 


Calcium  8.2 L


 


Magnesium 








 











  01/05/17 01/05/17 01/05/17





  07:08 07:08 12:15


 


Creatine Kinase  244 H   286 H


 


CK-MB (CK-2)   4.47 


 


Troponin I   0.107 














  01/05/17 01/05/17 01/05/17





  12:15 18:48 18:48


 


Creatine Kinase   306 H 


 


CK-MB (CK-2)  5.05 H   5.01 H


 


Troponin I  0.107   0.087














  01/06/17 01/06/17





  08:05 08:05


 


Creatine Kinase  299 H 


 


CK-MB (CK-2)   3.20


 


Troponin I   0.052











Impressions: 


 





Chest X-Ray  01/05/17 01:05


IMPRESSION:  NO ACUTE RADIOGRAPHIC FINDING IN THE CHEST.


 








Limited or Localized CT  01/05/17 03:21


IMPRESSION:  CHRONIC ATROPHIC LEFT KIDNEY WITH STABLE CORTICAL CYSTS.  NO 

SIGNIFICANT OR ACUTE PROCESS IN THE ABDOMEN OR PELVIS.


 














Assessment & Plan





- Diagnosis


(1) AVNRT (AV denisa re-entry tachycardia)


Is this a current diagnosis for this admission?: YesPlan: 


Presently in normal sinus rhythm status post multiple doses of adenosine and 

multiple attempts at cardioversion.


Amiodarone has been stopped.








(2) Acute kidney injury superimposed on chronic kidney disease


Is this a current diagnosis for this admission?: YesPlan: 


Kidney function is improving with rehydration








(3) Acute renal failure





Is this a current diagnosis for this admission?: Yes





(4) COPD exacerbation


Is this a current diagnosis for this admission?: YesPlan: 


We'll continue with nebulization treatments and start steroids.








(5) Hypothermia


Qualifiers: 


     Encounter type: initial encounter     Qualified Code(s): T68.XXXA - 

Hypothermia, initial encounter  





(6) SVT (supraventricular tachycardia)


Is this a current diagnosis for this admission?: YesPlan: 


Controlled with medications and is being followed by the cardiology.  This is a 

long-standing issue with recurrent episodes of SVT








(7) Hypothyroidism


Qualifiers: 


     Hypothyroidism type: unspecified        Qualified Code(s): E03.9 - 

Hypothyroidism, unspecified  


Is this a current diagnosis for this admission?: YesPlan: 


Patient's TSH was elevated most probably secondary to noncompliance at home.  We

'll restart medications and avoid using amiodarone

## 2017-01-06 NOTE — PROGRESS NOTE E
Progress Note



NAME: KAIT QUARLES

MRN: [id number]

: 1960             AGE: 56Y

DATE: 2017            ROOM: 325



SUBJECTIVE:

The patient states she feels a little better but she is still wheezing. 

There is no PND or orthopnea.  There is no leg edema.  There is no chest

pain or discomfort.  There is no recurrence of SVT.  There are no TIA or

CVA symptoms.



OBJECTIVE:

GENERAL:  On examination, the patient is well-built and well-nourished, at

present in no acute distress.



VITAL SIGNS:  She is afebrile with a temperature of 98.5 degrees

Fahrenheit orally.  Pulse is 65 beats per minute, blood pressure is 97/65,

respirations are 16 per minute and O2 saturations are 97% on room air.



HEENT:  Head is atraumatic, normocephalic.  Eyes:  Pupils are equal,

round, regular and reactive to light and accommodation.  Extraocular

movements are normal.  There is no conjunctival pallor.  There is no

scleral icterus.  ENT is negative.



NECK:  Supple.  There is no JVD.  There are bilateral carotid bruits

present, left greater than right.  There is a left carotid endarterectomy

scar present.  There is no lymphadenopathy.  There is no goiter.



LUNGS:  There is diminished air entry, prolonged expiration.  Also there

are scattered rhonchi and wheezing.  There are no rales or CHF.  There is

hyperresonance on percussion.



HEART:  S1 and S2 are heard.  There is no S3 gallop.  There is no S4

gallop.  There is a systolic murmur in the left sternal border of the

apex.  There is no rub.



ABDOMEN:  Soft, nontender.  There is no hepatosplenomegaly.  Bowel sounds

are well heard.  There are no tender areas or masses.



EXTREMITIES:  Femorals are diminished.  Leg pulses are diminished. There

are no femoral bruits.  There is no pedal edema.  There is no DVT or

cellulitis.  There is no calf tenderness.



CENTRAL NERVOUS SYSTEM:  The patient is conscious, awake, alert and

oriented x3 with no focal deficits.



PSYCHIATRIC:  The patient does appear to be depressed but her judgment and

insight are intact.



INTAKE/OUTPUT:  The patient's 24-hour intake is 2086 mL and output is 1000

mL.



DIAGNOSTIC TEST RESULTS:

The patient's white count is 6900, hemoglobin 12.7, hematocrit is 36.7,

and platelet count is 133,000.  The patient's sodium is 139.1, potassium

is 3.7, chloride 106, CO2 is 23, the patient's BUN is 14 creatinine is

1.29.  GFR has improved to 43 mL compared to 31 mL yesterday.  The

patient's troponin I is negative/indeterminate.



IMPRESSION:

1.  ACUTE EXACERBATION OF COPD.  Continue respiratory treatment.  Continue

steroids.  Continue antibiotics.

2.  SVT, no recurrence.  Note that the patient has a history of SVT in the

past.

3.  ACUTE RENAL FAILURE, ACUTE ON CHRONIC KIDNEY DISEASE.  At present she

is stage 3A with some improvement in the renal GFR after the patient had

been hydrated.

4.  HYPOTHERMIA.  This is resolved.  The patient's temperature is normal.

5.  HYPOTHYROIDISM MOST LIKELY DUE TO NONCOMPLIANCE AND ALSO PATIENT

HAVING VOMITED ON THE DAY THAT SHE TOOK THE THYROID REPLACEMENT.

6.  STATUS POST HISTORY OF LEFT CAROTID ENDARTERECTOMY.  The patient

states she has an occluded left carotid artery.

7.  HISTORY OF COLORECTAL CANCER, STABLE.

8.  HYPERTENSION.  Blood pressure remains on the low side.



RECOMMENDATIONS:

Continue antibiotics.  Continue respiratory treatment.  Continue current

medication.  Would not repeat the patient's thyroid with medication. 

Would not use amiodarone on this patient.  Would place the patient on

metoprolol 12.5 mg q.12 hours and increase as tolerated.  Continue

lisinopril.  Will follow with you.



Note 30 minutes spent on patient with more than 50% of the time spent on

direct patient care and discussion with the patient, discussion with Dr. Munoz, the attending on record on this patient.  Will follow with

you.  Note that the patient in the past has had cardiac catheterization

last year at [facility] which showed nonepisodic coronary artery disease.







DICTATING PHYSICIAN:  JAGDISH NJ M.D.





[id number]                  DT: 2017    1950

PHY#: [id number]            DD: 2017    1904

ID:   [id number]           JOB#: [id number]       ACCT: [id number]



cc:

>

## 2017-01-07 LAB
ANION GAP SERPL CALC-SCNC: 10 MMOL/L (ref 5–19)
BASOPHILS # BLD AUTO: 0 10^3/UL (ref 0–0.2)
BASOPHILS NFR BLD AUTO: 0.1 % (ref 0–2)
BUN SERPL-MCNC: 13 MG/DL (ref 7–20)
CALCIUM: 9 MG/DL (ref 8.4–10.2)
CHLORIDE SERPL-SCNC: 104 MMOL/L (ref 98–107)
CO2 SERPL-SCNC: 22 MMOL/L (ref 22–30)
CREAT SERPL-MCNC: 0.98 MG/DL (ref 0.52–1.25)
EOSINOPHIL # BLD AUTO: 0 10^3/UL (ref 0–0.6)
EOSINOPHIL NFR BLD AUTO: 0 % (ref 0–6)
ERYTHROCYTE [DISTWIDTH] IN BLOOD BY AUTOMATED COUNT: 14.8 % (ref 11.5–14)
GLUCOSE SERPL-MCNC: 106 MG/DL (ref 75–110)
HCT VFR BLD CALC: 33 % (ref 36–47)
HGB BLD-MCNC: 11.6 G/DL (ref 12–15.5)
HGB HCT DIFFERENCE: 1.8
LYMPHOCYTES # BLD AUTO: 0.7 10^3/UL (ref 0.5–4.7)
LYMPHOCYTES NFR BLD AUTO: 13 % (ref 13–45)
MCH RBC QN AUTO: 34.4 PG (ref 27–33.4)
MCHC RBC AUTO-ENTMCNC: 35.2 G/DL (ref 32–36)
MCV RBC AUTO: 98 FL (ref 80–97)
MONOCYTES # BLD AUTO: 0.2 10^3/UL (ref 0.1–1.4)
MONOCYTES NFR BLD AUTO: 4.1 % (ref 3–13)
NEUTROPHILS # BLD AUTO: 4.3 10^3/UL (ref 1.7–8.2)
NEUTS SEG NFR BLD AUTO: 82.8 % (ref 42–78)
POTASSIUM SERPL-SCNC: 4.1 MMOL/L (ref 3.6–5)
RBC # BLD AUTO: 3.38 10^6/UL (ref 3.72–5.28)
SODIUM SERPL-SCNC: 135.7 MMOL/L (ref 137–145)
WBC # BLD AUTO: 5.1 10^3/UL (ref 4–10.5)

## 2017-01-07 RX ADMIN — FLUTICASONE PROPIONATE SCH SPRAY: 50 SPRAY, METERED NASAL at 09:55

## 2017-01-07 RX ADMIN — IPRATROPIUM BROMIDE AND ALBUTEROL SULFATE PRN ML: 2.5; .5 SOLUTION RESPIRATORY (INHALATION) at 03:28

## 2017-01-07 RX ADMIN — IMIPENEM AND CILASTATIN SODIUM SCH MG: 500; 500 INJECTION, POWDER, FOR SOLUTION INTRAVENOUS at 13:01

## 2017-01-07 RX ADMIN — ASPIRIN SCH MG: 81 TABLET, CHEWABLE ORAL at 09:54

## 2017-01-07 RX ADMIN — LISINOPRIL SCH MG: 5 TABLET ORAL at 22:10

## 2017-01-07 RX ADMIN — OXYCODONE HYDROCHLORIDE PRN MG: 5 TABLET ORAL at 03:53

## 2017-01-07 RX ADMIN — HEPARIN SODIUM SCH UNIT: 5000 INJECTION, SOLUTION INTRAVENOUS; SUBCUTANEOUS at 14:13

## 2017-01-07 RX ADMIN — IMIPENEM AND CILASTATIN SODIUM SCH MG: 500; 500 INJECTION, POWDER, FOR SOLUTION INTRAVENOUS at 05:00

## 2017-01-07 RX ADMIN — HEPARIN SODIUM SCH UNIT: 5000 INJECTION, SOLUTION INTRAVENOUS; SUBCUTANEOUS at 05:34

## 2017-01-07 RX ADMIN — HYDRALAZINE HYDROCHLORIDE PRN MG: 20 INJECTION INTRAMUSCULAR; INTRAVENOUS at 20:06

## 2017-01-07 RX ADMIN — LEVOTHYROXINE SODIUM SCH MG: 150 TABLET ORAL at 05:40

## 2017-01-07 RX ADMIN — ATORVASTATIN CALCIUM SCH MG: 20 TABLET, FILM COATED ORAL at 22:10

## 2017-01-07 RX ADMIN — FLUTICASONE PROPIONATE SCH SPRAY: 50 SPRAY, METERED NASAL at 18:07

## 2017-01-07 RX ADMIN — METOPROLOL TARTRATE SCH MG: 25 TABLET, FILM COATED ORAL at 05:39

## 2017-01-07 RX ADMIN — OXYCODONE HYDROCHLORIDE PRN MG: 5 TABLET ORAL at 23:53

## 2017-01-07 RX ADMIN — HEPARIN SODIUM SCH UNIT: 5000 INJECTION, SOLUTION INTRAVENOUS; SUBCUTANEOUS at 21:03

## 2017-01-07 RX ADMIN — CLINDAMYCIN PHOSPHATE SCH ML: 12 INJECTION, SOLUTION INTRAVENOUS at 06:03

## 2017-01-07 RX ADMIN — LISINOPRIL SCH MG: 5 TABLET ORAL at 09:59

## 2017-01-07 RX ADMIN — METOPROLOL TARTRATE SCH MG: 25 TABLET, FILM COATED ORAL at 18:06

## 2017-01-07 RX ADMIN — LIDOCAINE SCH PATCH: 50 PATCH CUTANEOUS at 10:00

## 2017-01-07 RX ADMIN — DOCUSATE SODIUM SCH MG: 100 CAPSULE, LIQUID FILLED ORAL at 10:00

## 2017-01-07 RX ADMIN — POTASSIUM CHLORIDE SCH MEQ: 750 TABLET, FILM COATED, EXTENDED RELEASE ORAL at 09:54

## 2017-01-07 RX ADMIN — OXYCODONE HYDROCHLORIDE PRN MG: 5 TABLET ORAL at 10:22

## 2017-01-07 RX ADMIN — PREDNISONE SCH MG: 20 TABLET ORAL at 18:06

## 2017-01-07 RX ADMIN — OXYCODONE HYDROCHLORIDE PRN MG: 5 TABLET ORAL at 19:57

## 2017-01-07 RX ADMIN — PREDNISONE SCH MG: 20 TABLET ORAL at 09:54

## 2017-01-07 RX ADMIN — LANSOPRAZOLE SCH MG: 30 TABLET, ORALLY DISINTEGRATING, DELAYED RELEASE ORAL at 05:38

## 2017-01-07 RX ADMIN — OXYCODONE HYDROCHLORIDE PRN MG: 5 TABLET ORAL at 15:03

## 2017-01-07 RX ADMIN — DOCUSATE SODIUM SCH MG: 100 CAPSULE, LIQUID FILLED ORAL at 18:07

## 2017-01-07 NOTE — PDOC PROGRESS REPORT
Subjective


Progress Note for:: 01/07/17


Subjective:: 


Complains of lower back pain.





Physical Exam


Vital Signs: 


 











Temp Pulse Resp BP Pulse Ox


 


 98.5 F   67   18   147/91 H  100 


 


 01/07/17 08:02  01/07/17 08:02  01/07/17 08:02  01/07/17 08:02  01/07/17 08:02








 Intake & Output











 01/06/17 01/07/17 01/08/17





 06:59 06:59 06:59


 


Intake Total 2086 3336 


 


Output Total 1000 2150 


 


Balance 1086 1186 


 


Weight 50.1 kg 50.1 kg 











General appearance: PRESENT: no acute distress


Eye exam: PRESENT: conjunctiva pink


Mouth exam: PRESENT: moist, tongue midline


Neck exam: ABSENT: JVD


Respiratory exam: PRESENT: wheezes - Expiratory wheezes in the left lung fields.


Cardiovascular exam: PRESENT: RRR.  ABSENT: diastolic murmur, rubs, systolic 

murmur


GI/Abdominal exam: PRESENT: normal bowel sounds, soft.  ABSENT: distended, 

guarding, mass, organolmegaly, rebound, tenderness


Extremities exam: ABSENT: calf tenderness, clubbing, pedal edema


Neurological exam: PRESENT: alert, awake, oriented to person, oriented to place

, oriented to time, oriented to situation


Psychiatric exam: PRESENT: appropriate affect


Skin exam: PRESENT: dry, intact, warm.  ABSENT: cyanosis, rash





Results


Laboratory Results: 


 





 01/07/17 04:10 





 01/07/17 04:10 





 











  01/07/17 01/07/17 01/07/17





  04:10 04:10 04:10


 


WBC  5.1  


 


RBC  3.38 L  


 


Hgb  11.6 L  


 


Hct  33.0 L  


 


MCV  98 H  


 


MCH  34.4 H  


 


MCHC  35.2  


 


RDW  14.8 H  


 


Plt Count  109 L  


 


Seg Neutrophils %  82.8 H  


 


Lymphocytes %  13.0  


 


Monocytes %  4.1  


 


Eosinophils %  0.0  


 


Basophils %  0.1  


 


Absolute Neutrophils  4.3  


 


Absolute Lymphocytes  0.7  


 


Absolute Monocytes  0.2  


 


Absolute Eosinophils  0.0  


 


Absolute Basophils  0.0  


 


Sodium   135.7 L 


 


Potassium   4.1 


 


Chloride   104 


 


Carbon Dioxide   22 


 


Anion Gap   10 


 


BUN   13 


 


Creatinine   0.98 


 


Est GFR ( Amer)   > 60 


 


Est GFR (Non-Af Amer)   58 L 


 


Glucose   106 


 


Calcium   9.0 


 


TSH    10.00 H








 











  01/05/17 01/05/17 01/05/17





  07:08 07:08 12:15


 


Creatine Kinase  244 H   286 H


 


CK-MB (CK-2)   4.47 


 


Troponin I   0.107 














  01/05/17 01/05/17 01/05/17





  12:15 18:48 18:48


 


Creatine Kinase   306 H 


 


CK-MB (CK-2)  5.05 H   5.01 H


 


Troponin I  0.107   0.087














  01/06/17 01/06/17 01/06/17





  08:05 08:05 14:13


 


Creatine Kinase  299 H   278 H


 


CK-MB (CK-2)   3.20 


 


Troponin I   0.052 














  01/06/17 01/06/17 01/06/17





  14:13 20:35 20:35


 


Creatine Kinase    228 H


 


CK-MB (CK-2)  2.87  2.15 


 


Troponin I  0.027  0.018 











Impressions: 


 





Chest X-Ray  01/05/17 01:05


IMPRESSION:  NO ACUTE RADIOGRAPHIC FINDING IN THE CHEST.


 








Limited or Localized CT  01/05/17 03:21


IMPRESSION:  CHRONIC ATROPHIC LEFT KIDNEY WITH STABLE CORTICAL CYSTS.  NO 

SIGNIFICANT OR ACUTE PROCESS IN THE ABDOMEN OR PELVIS.


 














Assessment & Plan





- Diagnosis


(1) AVNRT (AV denisa re-entry tachycardia)


Is this a current diagnosis for this admission?: YesPlan: 


The patient has had no more episodes of AV denisa reentrant tachycardia.  

Patient has been evaluated by cardiology.  We'll follow cardiology's 

recommendations.








(2) SIRS (systemic inflammatory response syndrome)


Is this a current diagnosis for this admission?: YesPlan: 


The patient presented with abdominal pain suspicious for diverticulitis.  She 

has had an improvement in her abdominal pain.  She currently is on antibiotics 

and will continue with the IV antibiotics for 1 more day and switch over to by 

mouth tomorrow if she continues to improve.








(3) Acute diverticulitis


Is this a current diagnosis for this admission?: YesPlan: 


She is on imipenem and clindamycin.  Her pain has improved.








(4) Acute kidney injury superimposed on chronic kidney disease


Is this a current diagnosis for this admission?: YesPlan: 


Her renal function has improved with IV fluids.  Her creatinine is back to 

normal now.








(5) COPD exacerbation


Is this a current diagnosis for this admission?: YesPlan: 


Patient has some wheezing on exam.  She is on steroids and nebulizers.








(6) Hypothyroidism


Qualifiers: 


     Hypothyroidism type: unspecified        Qualified Code(s): E03.9 - 

Hypothyroidism, unspecified  


Is this a current diagnosis for this admission?: YesPlan: 


Continue with the Synthroid.  The patient's amiodarone was stopped.








(7) Back pain





Is this a current diagnosis for this admission?: YesPlan: 


We'll increase the frequency of her oxycodone.  She also has a lidocaine patch 

in place











- Time


Time Spent with patient: 25-34 minutes





- Inpatient Certification


Medical Necessity: Need for IV Antibiotics

## 2017-01-07 NOTE — PROGRESS NOTE E
Progress Note



NAME: KAIT QUARLES

MRN: [id number]

: 1960             AGE: 56Y

DATE:  2017          ROOM: 325



SUBJECTIVE:

The patient states her shortness of breath is slightly better but she is

now complaining of low back pain.  She denies any chest pain or

discomfort.  There is no PND or orthopnea.  There is no leg edema.  There

is no TIA or CVA symptoms.  There is no recurrence of SVT.  There is no

ventricular arrhythmia seen on the monitor.



OBJECTIVE:

GENERAL:  On examination, the patient is well built and well nourished, at

present seems to be in some distress due to low back pain.



VITAL SIGNS:  She is afebrile with a temperature of 98.6 degrees

Fahrenheit, pulse is 84 beats per minute, blood pressure 129/86,

respirations are 18 per minute, O2 saturations are 99% on room air.



HEAD:  Atraumatic, normocephalic.



EYES:  Pupils are equal, round, regular, and reactive to light and

accommodation.  Extraocular movements are normal.  There is no

conjunctival pallor.  There is no scleral icterus.



EARS, NOSE, THROAT:  Negative.



NECK:  Supple.  There is no JVD.  There are bilateral carotid bruits

present, left greater than right.  There is a left carotid endarterectomy

scar present.  There is no lymphadenopathy.  There is no goiter.



LUNGS:  There is diminished air entry, prolonged expiration.  Also, there

are scattered rhonchi and wheezing, left lung more than right lung.  There

are no rales of CHF.  There is hyperresonance of percussion.



HEART:  S1 and S2 are heard.  There is no S3 gallop.  There is no S4

gallop.  There is a systolic murmur in the left sternal border on the

apex.  There is no rub.



ABDOMEN:  Soft, nontender.  There is no hepatosplenomegaly.  Bowel sounds

are well heard.  There are no tender areas or masses.



EXTREMITIES:  Femorals are diminished.  Leg pulses are diminished.  There

are no femoral bruits.  There is no pedal edema.  There is no DVT or

cellulitis.  There is no calf tenderness.



CENTRAL NERVOUS SYSTEM:  The patient is conscious, awake, alert, oriented

x3 with no focal deficit.



PSYCHIATRIC:  The patient appears to be slightly depressed but she is not

agitated.  Her judgment and insight are intact.



Note, the patient also has a diagnosis of diverticulitis and there is

nausea.  She feels a little nauseous but there is no vomiting.  She has

been having diarrhea.



LABORATORY DATA:

The patient's white count is 5100; hemoglobin is 11.6; hematocrit is 33;

platelet count is 109,000.



The patient's sodium is 135.7, potassium 4.1, chloride 104, CO2 is 22. 

The patient's BUN is 13, creatinine is 0.98, GFR is reduced at 58, which

is chronic kidney disease stage 2 with improvement in the GFR, glucose is

106, calcium is 9.0.



Her TSH has come down to 10.



The patient's cardiac enzymes have been negative.



IMPRESSION:

1.  ACUTE EXACERBATION OF CHRONIC OBSTRUCTIVE PULMONARY DISEASE.  Continue

respiratory treatment.  Continue steroids.  Continue antibiotics.

2.  SUPRAVENTRICULAR TACHYCARDIA.  No recurrence.

3.  ACUTE ON CHRONIC RENAL FAILURE.  GFR is improved, at present is stage

2.

4.  HYPOTHERMIA.  This has resolved.  The patient's temperature is normal.

5.  HYPOTHYROIDISM, MOST LIKELY DUE TO NONCOMPLIANCE AND ALSO PATIENT

HAVING VOMITED ON DAYS THAT SHE TOOK THE THYROID REPLACEMENT.  Her TSH has

come down to 10 from 35.

6.  STATUS POST HISTORY OF LEFT CAROTID ENDARTERECTOMY.  AT PRESENT, SHE

HAS AN OCCLUDED LEFT CAROTID ARTERY AS PER HER CAROTID DOPPLER OF 2016 WHICH REPORT I HAVE REVIEWED.

7.  HISTORY OF COLORECTAL CANCER, STABLE.

8.  HYPERTENSION.  Blood pressure is stable and well controlled.

9.  DIVERTICULITIS.  Patient improving.

 

RECOMMENDATION:

1.  Continue antibiotics.

2.  Continue respiratory treatment.

3.  Continue current medication.

4.  Continue thyroid replacement.

5.  Continue metoprolol 12.5 mg p.o. q. 12 hours and increase as

tolerated.

6.  Continue lisinopril.

7.  We will follow with you.

8.  Would increase the patient's metoprolol to 25 mg p.o. q. 12 hours.



Note that the patient by cath in  has had nonobstructive coronary

artery disease.  Hence, this is not a major factor in the patient's

current admission.



TIME SPENT:

Note, 35 minutes spent on this patient with more than 50% of the time

spent on direct patient care and also review of the patient's medications

and adjusting the patient's medications.



Discussed with the patient and discussed with the hospitalist covering Dr. Munoz.



We will follow with you.





DICTATING PHYSICIAN:  JAGDISH NJ M.D.





[id number]                  DT: 2017

PHY#: [id number]            DD: 2017

ID:   [id number]           JOB#: [id number]       ACCT: [id number]



cc:

>

## 2017-01-08 LAB
ANION GAP SERPL CALC-SCNC: 10 MMOL/L (ref 5–19)
BASOPHILS # BLD AUTO: 0 10^3/UL (ref 0–0.2)
BASOPHILS NFR BLD AUTO: 0.3 % (ref 0–2)
BUN SERPL-MCNC: 15 MG/DL (ref 7–20)
CALCIUM: 9.8 MG/DL (ref 8.4–10.2)
CHLORIDE SERPL-SCNC: 103 MMOL/L (ref 98–107)
CO2 SERPL-SCNC: 27 MMOL/L (ref 22–30)
CREAT SERPL-MCNC: 1.11 MG/DL (ref 0.52–1.25)
EOSINOPHIL # BLD AUTO: 0 10^3/UL (ref 0–0.6)
EOSINOPHIL NFR BLD AUTO: 0 % (ref 0–6)
ERYTHROCYTE [DISTWIDTH] IN BLOOD BY AUTOMATED COUNT: 14.4 % (ref 11.5–14)
GLUCOSE SERPL-MCNC: 108 MG/DL (ref 75–110)
HCT VFR BLD CALC: 35.5 % (ref 36–47)
HGB BLD-MCNC: 12.1 G/DL (ref 12–15.5)
HGB HCT DIFFERENCE: 0.8
LYMPHOCYTES # BLD AUTO: 0.6 10^3/UL (ref 0.5–4.7)
LYMPHOCYTES NFR BLD AUTO: 9.4 % (ref 13–45)
MCH RBC QN AUTO: 33.7 PG (ref 27–33.4)
MCHC RBC AUTO-ENTMCNC: 34.1 G/DL (ref 32–36)
MCV RBC AUTO: 99 FL (ref 80–97)
MONOCYTES # BLD AUTO: 0.4 10^3/UL (ref 0.1–1.4)
MONOCYTES NFR BLD AUTO: 6.3 % (ref 3–13)
NEUTROPHILS # BLD AUTO: 5.7 10^3/UL (ref 1.7–8.2)
NEUTS SEG NFR BLD AUTO: 84 % (ref 42–78)
POTASSIUM SERPL-SCNC: 4.1 MMOL/L (ref 3.6–5)
RBC # BLD AUTO: 3.59 10^6/UL (ref 3.72–5.28)
SODIUM SERPL-SCNC: 139.9 MMOL/L (ref 137–145)
WBC # BLD AUTO: 6.8 10^3/UL (ref 4–10.5)

## 2017-01-08 RX ADMIN — CLINDAMYCIN HYDROCHLORIDE SCH MG: 150 CAPSULE ORAL at 12:13

## 2017-01-08 RX ADMIN — OXYCODONE HYDROCHLORIDE PRN MG: 5 TABLET ORAL at 12:13

## 2017-01-08 RX ADMIN — DOCUSATE SODIUM SCH MG: 100 CAPSULE, LIQUID FILLED ORAL at 17:32

## 2017-01-08 RX ADMIN — FLUTICASONE PROPIONATE SCH SPRAY: 50 SPRAY, METERED NASAL at 09:42

## 2017-01-08 RX ADMIN — LANSOPRAZOLE SCH MG: 30 TABLET, ORALLY DISINTEGRATING, DELAYED RELEASE ORAL at 05:36

## 2017-01-08 RX ADMIN — DOCUSATE SODIUM SCH MG: 100 CAPSULE, LIQUID FILLED ORAL at 09:44

## 2017-01-08 RX ADMIN — CLINDAMYCIN HYDROCHLORIDE SCH MG: 150 CAPSULE ORAL at 17:29

## 2017-01-08 RX ADMIN — PREDNISONE SCH MG: 20 TABLET ORAL at 09:43

## 2017-01-08 RX ADMIN — OXYCODONE HYDROCHLORIDE PRN MG: 5 TABLET ORAL at 20:19

## 2017-01-08 RX ADMIN — IPRATROPIUM BROMIDE AND ALBUTEROL SULFATE PRN ML: 2.5; .5 SOLUTION RESPIRATORY (INHALATION) at 01:32

## 2017-01-08 RX ADMIN — ASPIRIN SCH MG: 81 TABLET, CHEWABLE ORAL at 09:43

## 2017-01-08 RX ADMIN — LEVOTHYROXINE SODIUM SCH MG: 150 TABLET ORAL at 05:37

## 2017-01-08 RX ADMIN — LISINOPRIL SCH MG: 5 TABLET ORAL at 09:42

## 2017-01-08 RX ADMIN — OXYCODONE HYDROCHLORIDE PRN MG: 5 TABLET ORAL at 06:13

## 2017-01-08 RX ADMIN — HEPARIN SODIUM SCH UNIT: 5000 INJECTION, SOLUTION INTRAVENOUS; SUBCUTANEOUS at 21:18

## 2017-01-08 RX ADMIN — METOPROLOL TARTRATE SCH MG: 25 TABLET, FILM COATED ORAL at 05:36

## 2017-01-08 RX ADMIN — ATORVASTATIN CALCIUM SCH MG: 20 TABLET, FILM COATED ORAL at 21:19

## 2017-01-08 RX ADMIN — METOPROLOL TARTRATE SCH MG: 25 TABLET, FILM COATED ORAL at 17:30

## 2017-01-08 RX ADMIN — HEPARIN SODIUM SCH UNIT: 5000 INJECTION, SOLUTION INTRAVENOUS; SUBCUTANEOUS at 05:29

## 2017-01-08 RX ADMIN — LIDOCAINE SCH PATCH: 50 PATCH CUTANEOUS at 09:44

## 2017-01-08 RX ADMIN — PREDNISONE SCH MG: 20 TABLET ORAL at 17:30

## 2017-01-08 RX ADMIN — CLINDAMYCIN HYDROCHLORIDE SCH MG: 150 CAPSULE ORAL at 23:13

## 2017-01-08 RX ADMIN — POTASSIUM CHLORIDE SCH MEQ: 750 TABLET, FILM COATED, EXTENDED RELEASE ORAL at 09:43

## 2017-01-08 RX ADMIN — HEPARIN SODIUM SCH UNIT: 5000 INJECTION, SOLUTION INTRAVENOUS; SUBCUTANEOUS at 13:37

## 2017-01-08 RX ADMIN — HYDRALAZINE HYDROCHLORIDE PRN MG: 20 INJECTION INTRAMUSCULAR; INTRAVENOUS at 08:06

## 2017-01-08 RX ADMIN — FLUTICASONE PROPIONATE SCH SPRAY: 50 SPRAY, METERED NASAL at 17:31

## 2017-01-08 NOTE — PDOC PROGRESS REPORT
Subjective


Progress Note for:: 01/08/17


Subjective:: 


The patient developed a rash yesterday and her imipenem and clindamycin were 

stopped.  Today she has some rash on the palms of her hands and also some 

itching of the scalp.  This is suspicious for an allergic reaction to the 

imipenem given her history of penicillin allergy.





Physical Exam


Vital Signs: 


 











Temp Pulse Resp BP Pulse Ox


 


 97.6 F   65   16   187/97 H  99 


 


 01/08/17 07:12  01/08/17 07:12  01/08/17 07:12  01/08/17 07:12  01/08/17 07:12








 Intake & Output











 01/07/17 01/08/17 01/09/17





 06:59 06:59 06:59


 


Intake Total 3336 1703 


 


Output Total 2150 2700 


 


Balance 1186 -995 


 


Weight 50.1 kg 51.6 kg 











General appearance: PRESENT: no acute distress


Eye exam: PRESENT: conjunctiva pink


Mouth exam: PRESENT: moist, tongue midline


Neck exam: ABSENT: JVD


Respiratory exam: PRESENT: clear to auscultation blake.  ABSENT: rales, rhonchi, 

wheezes


Cardiovascular exam: PRESENT: RRR.  ABSENT: diastolic murmur, rubs, systolic 

murmur


GI/Abdominal exam: PRESENT: normal bowel sounds, soft.  ABSENT: distended, 

guarding, mass, organolmegaly, rebound, tenderness


Extremities exam: PRESENT: full ROM.  ABSENT: calf tenderness, clubbing, pedal 

edema


Neurological exam: PRESENT: alert, awake, oriented to person, oriented to place

, oriented to time, oriented to situation


Psychiatric exam: PRESENT: appropriate affect


Skin exam: PRESENT: other - Patient has erythematous rash on bilateral hands.





Results


Laboratory Results: 


 





 01/08/17 05:40 





 01/08/17 05:40 





 











  01/08/17 01/08/17





  05:40 05:40


 


WBC  6.8 


 


RBC  3.59 L 


 


Hgb  12.1 


 


Hct  35.5 L 


 


MCV  99 H 


 


MCH  33.7 H 


 


MCHC  34.1 


 


RDW  14.4 H 


 


Plt Count  136 L 


 


Seg Neutrophils %  84.0 H 


 


Lymphocytes %  9.4 L 


 


Monocytes %  6.3 


 


Eosinophils %  0.0 


 


Basophils %  0.3 


 


Absolute Neutrophils  5.7 


 


Absolute Lymphocytes  0.6 


 


Absolute Monocytes  0.4 


 


Absolute Eosinophils  0.0 


 


Absolute Basophils  0.0 


 


Sodium   139.9


 


Potassium   4.1


 


Chloride   103


 


Carbon Dioxide   27


 


Anion Gap   10


 


BUN   15


 


Creatinine   1.11


 


Est GFR ( Amer)   > 60


 


Est GFR (Non-Af Amer)   51 L


 


Glucose   108


 


Calcium   9.8








 











  01/05/17 01/05/17 01/05/17





  07:08 07:08 12:15


 


Creatine Kinase  244 H   286 H


 


CK-MB (CK-2)   4.47 


 


Troponin I   0.107 














  01/05/17 01/05/17 01/05/17





  12:15 18:48 18:48


 


Creatine Kinase   306 H 


 


CK-MB (CK-2)  5.05 H   5.01 H


 


Troponin I  0.107   0.087














  01/06/17 01/06/17 01/06/17





  08:05 08:05 14:13


 


Creatine Kinase  299 H   278 H


 


CK-MB (CK-2)   3.20 


 


Troponin I   0.052 














  01/06/17 01/06/17 01/06/17





  14:13 20:35 20:35


 


Creatine Kinase    228 H


 


CK-MB (CK-2)  2.87  2.15 


 


Troponin I  0.027  0.018 











Impressions: 


 





Limited or Localized CT  01/05/17 03:21


IMPRESSION:  CHRONIC ATROPHIC LEFT KIDNEY WITH STABLE CORTICAL CYSTS.  NO 

SIGNIFICANT OR ACUTE PROCESS IN THE ABDOMEN OR PELVIS.


 








Chest X-Ray  01/08/17 06:00


IMPRESSION:  NO ACUTE RADIOGRAPHIC FINDING IN THE CHEST.


 














Assessment & Plan





- Diagnosis


(1) AVNRT (AV denisa re-entry tachycardia)


Is this a current diagnosis for this admission?: YesPlan: 


The patient has had no more episodes of AV denisa reentrant tachycardia.  

Patient has been evaluated by cardiology.  We'll follow cardiology's 

recommendations.








(2) SIRS (systemic inflammatory response syndrome)


Is this a current diagnosis for this admission?: YesPlan: 





The patient presented with abdominal pain suspicious for diverticulitis.  She 

has had an improvement in her abdominal pain.  Her antibiotics were stopped 

yesterday because of a rash.  Patient was getting both clindamycin and 

imipenem.  Given her severe penicillin allergy my suspicion is that she is 

allergic imipenem.  We will restart clindamycin orally and she has any 

worsening of her rash we would stop that too and give steroids.








(3) Acute diverticulitis


Is this a current diagnosis for this admission?: YesPlan: 





Her pain has resolved.  Going to restart the clindamycin orally today.








(4) Acute kidney injury superimposed on chronic kidney disease


Is this a current diagnosis for this admission?: YesPlan: 


Her renal function has improved with IV fluids.  Her creatinine is back to 

normal now.








(5) COPD exacerbation


Is this a current diagnosis for this admission?: YesPlan: 





Patient has no wheezing on exam.  She is on steroids and nebulizers.








(6) Hypothyroidism


Qualifiers: 


     Hypothyroidism type: unspecified        Qualified Code(s): E03.9 - 

Hypothyroidism, unspecified  


Is this a current diagnosis for this admission?: YesPlan: 


Continue with the Synthroid.  The patient's amiodarone was stopped.








(7) Back pain





Is this a current diagnosis for this admission?: YesPlan: 





This has improved.  We'll continue with lidocaine patch as well as analgesics 

when necessary.








(8) Hypertension





Is this a current diagnosis for this admission?: YesPlan: 


The blood pressures have fluctuated.  Given the cardiology is following for her 

tachycardia will defer changing her antihypertensives to cardiology's 

recommendations.











- Time


Time Spent with patient: 25-34 minutes





- Inpatient Certification


Medical Necessity: Need Close Monitoring Due to Risk of Patient Decompensation

## 2017-01-08 NOTE — CONSULTATION REPORT E
Consultation Report



NAME: KAIT QUARLES

MRN:  [id number]               : 1960      AGE: 56Y

DATE:  2017    325  A



TO:   JAGDISH NJ M.D.



FROM: DEVI DAWKINS M.D.

      Requesting Physician



HISTORY OF PRESENT ILLNESS:

The patient is a 56-year-old  female with known history of

hypertension, history of tobacco abuse who stopped smoking 2017,

history of COPD, history of seizure disorder, past history of SVT, and

history of hepatitis C who states that about 12 days prior to admission

had been diagnosed with urinary tract infection and was placed on

antibiotics.  She initially had constipation and subsequently started

having vomiting, diarrhea, and generalized body aches.  She also states

that since the past few days she has been having orthopnea with wheezing

and cough productive of white sputum.  She denies any chest pain or

discomfort.  In the Emergency Room, she was found to be in SVT with

decreased blood pressure and was given a bolus of amiodarone and was also

started on amiodarone drip and converted to sinus rhythm.  The patient at

present denies any leg edema.  There is no PND, but there is orthopnea. 

The patient continues to be short of breath and is wheezing and has cough

every now and then.  She also was found to be hypothermic.  Note that the

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

18 mg, respectively, without cardioversion and then was started on

amiodarone.  She also had unsuccessful cardioversion attempt x3 followed

by amiodarone drip and went into sinus rhythm.  She was also found to be

in acute renal failure.  The patient has a history of chronic kidney

disease.



PAST MEDICAL HISTORY:

Positive for:

1.  Coronary artery disease which is mild and nonobstructive by cardiac

catheterization at [facility] last year.

2.  She has a history of hyperlipidemia.

3.  History of hypertension.

4.  She also has a history of SVT.

5.  She has a history of COPD.  She recently quit smoking 2017.

6.  She has a history of seizures, none recently.

7.  She also has a history of hypothyroidism.  The patient states that due

to vomiting she has not been taking her medicines regularly and on the

days that she took she vomited it.  Hence, for the last 3 weeks, she has

not been taking her thyroid medication.

8.  She also has a history of hepatitis C which is stable.

9.  She also has a history of arthritis and gout.

10.  She has no history of TIA or CVA although she has carotid stenosis

and had a left carotid endarterectomy which she states after the

colorectal cancer the carotid endarterectomy vessel (left internal carotid

artery) was completely occluded.

11.  She also has a history of colorectal cancer treated with chemotherapy

and radiation and is in remission.



PAST SURGICAL HISTORY:

Positive for:

1.  Thyroidectomy.

2.  Appendectomy.

3.  Cardiac catheterization.

4.  Left carotid endarterectomy.

5.  She has had both right and left hip replacement.

6.  She also has had fusion of C-spine, C6 through C7.



ALLERGIES:

She is allergic to multiple medications includin.  CIPRO.

2.  PENICILLIN.

3.  CILASTATIN.

4.  IMIPENEM.

5.  CEPHALEXIN MONOHYDRATE (KEFLEX).



FAMILY HISTORY:

Family history is positive for hypertension.



REVIEW OF SYSTEMS:

CONSTITUTIONAL:  Complains of generalized fatigue and malaise.  She felt

chills but no fever.



HEAD:  No history of headaches or head injury.



EYES:  No history of amblyopia or diplopia.  No history of amaurosis

fugax.



EARS:  No history of hearing loss.  No history of tinnitus.  No history of

recurrent ear infections.



NOSE:  No history of hay fever.  No history of nosebleeds.  No history of

nasal polyps.



MOUTH:  No history of altered taste sensation.  No history of ulcers in

the mouth.  No bleeding from the gums.



THROAT:  There is no history of odynophagia or dysphagia.  No history of

recurrent sore throats.



SKIN:  No history of pruritus.  No history of yellowish discoloration of

the skin.  No history of skin cancer.  No psoriasis.



NECK:  No enlarged neck lymph nodes.  No goiter.  No painful or painless

swelling in the neck.  She has a history of C-spine arthritis for which

she has had fusion of C6-C7 vertebrae.



LUNGS:  History of COPD.  Recent symptoms of acute exacerbation of COPD

with wheezing and sputum production.  No hemoptysis.  No pleuritic chest

pain.  No history of pulmonary embolism.  No history of sleep apnea.



CARDIAC:  History of hypertension.  History of mild nonobstructive

coronary artery disease.  Catheterization was done last year due to

elevation of troponin-I.  History of SVT in the past, none recently except

this admission.  No history of leg edema.  No history of syncope.



GASTROINTESTINAL:  No history of GI bleed.  No history of peptic ulcer

disease.  History of hepatitis C.  History of diarrhea preceded by

constipation.  History of nausea and vomiting.  No abdominal pain.  No

history of fatty food intolerance.  No history of jaundice.



METABOLIC:  History of hyperlipidemia at present and history of gout

present.



RENAL:  History of chronic kidney disease, was a stage 3, now has worsened

to stage 3B; usually she is a 3A.  No symptoms of UTI although the patient

was treated for urinary tract infection about 2 weeks ago.  No history of

hematuria, polyuria, or dysuria.



MUSCULOSKELETAL:  History of arthritis present.  No collagen vascular

disease.



CENTRAL NERVOUS SYSTEM:  No history of TIA or CVA.  Left carotid

endarterectomy with occlusion of the left internal carotid artery after

the surgery.  History of seizures, none recently.  No history of

headaches, migraines, or gait imbalance.



PSYCHIATRIC:  No history of anxiety or depression, but the patient does

look depressed.  No suicidal ideation.



VASCULAR:  No history of calf or buttock claudication.  No history of DVT.



HEMATOLOGIC:  No history of bleeding diathesis.  No history of clotting

disorders.



The rest of the review of systems positive for history of colorectal

cancer in remission after chemotherapy and radiation therapy.



PHYSICAL EXAMINATION:

GENERAL:  On examination, the patient is well built and well nourished,

appears to be slightly depressed in some distress due to respiratory

failure difficulty.  No accessory muscles of respiration used.



VITAL SIGNS:  She is afebrile with a temperature of 98.2 degrees

Fahrenheit.  Pulse is 67 beats per minute.  Blood pressure 134/80. 

Respirations are 18 per minute.  O2 saturations are 95% on room air.



HEENT:  Head is atraumatic, normocephalic.  Eyes:  Pupils are equal,

round, regular, reactive to light and accommodation.  Extraocular

movements normal.  There is no conjunctival pallor.  There is no scleral

icterus.  Ears:  Tympanic membranes are intact.  External auditory canals

are clear.  Nose:  There is no deviated nasal septum.  There is no

inflammation of the nasal mucous membranes.  Mouth:  Mucous membranes of

the mouth are slightly dry.  Tongue is dry.  There are no ulcers in the

tongue or mouth.  There is no bleeding from the gums.  Throat:  There is

no redness of the oropharynx.  There are no exudates.



SKIN:  There are no skin rashes.  There is no petechiae or ecchymosis.



NECK:  Supple.  There is no JVD.  Carotids are equal; there are bilateral

bruits present, left greater than right.  There is no carotid delay. 

There is no lymphadenopathy.  There is no goiter.



LUNGS:  Diminished air entry, prolonged expirations.  Scattered rhonchi

and wheezing bilaterally.  Hyperresonance on percussion.  There are no

rales of CHF.



CARDIOVASCULAR:  S1, S2 are heard.  There is no S3 gallop.  There is no S4

gallop.  There is systolic murmur at left sternal border and the apex. 

There is no rub.



ABDOMEN:  Soft, nontender.  There is no hepatosplenomegaly.  Bowel sounds

are well heard.



EXTREMITIES:  Femorals are slightly diminished.  There are no femoral

bruits.  Leg pulses are 1+.  There is no pedal edema.  There is no DVT or

cellulitis.  There is no calf tenderness.



CENTRAL NERVOUS SYSTEM:   The patient is conscious, awake, alert, oriented

x3 with no focal deficits.



PSYCHIATRIC:  The patient's judgment and insight are intact.  She appears

to be slightly depressed.  She is not agitated.



DIAGNOSTIC TESTS:

The patient's initial EKG shows sinus rhythm with first degree AV block,

low voltage with right axis deviation, concentric left ventricular

hypertrophy.  Borderline QT interval prolongation.



The patient's chest x-ray shows no acute findings.  There is no evidence

of CHF or infiltrates.



The patient's other EKG strips show sinus rhythm.



The patient's limited or localized CT shows chronic atrophic left kidney

with stable cortical cysts.  No significant acute process in the abdomen

or pelvis.



The patient's white count is 12,300; hemoglobin is 17.3; hematocrit is

49.1; platelet count is 178,000.  The patient's TSH is 35.70.  The

patient's sodium is 144.3; potassium is 3.8; chloride is 99; CO2 is 23. 

The patient's BUN is 21; creatinine is 1.71.  Her GFR is 31 mmHg which is

chronic kidney disease stage 3B.  Calcium is 10.4.  Her liver function

tests are normal.  Total protein is 8.6; albumin is 4.8.  Her free T4 is

1.01.  Her free T3 is 2.33.  The patient's troponin-I is 0.107.  The

patient's urine marijuana is unconfirmed positive.  The patient's urine

benzodiazepine is unconfirmed positive.  Her amphetamine, phencyclidine,

barbiturates, methadone, and opiates are all negative.  Her urine cocaine

screen is negative.  The patient's influenza A and B rapid tests are both

negative.



IMPRESSION:

1.  Acute exacerbation of chronic obstructive pulmonary disease.  Continue

antibiotics.  Continue steroids and continue respiratory treatments.

2.  Supraventricular tachycardia, no recurrence.  Note that the patient is

hypothyroid.  Would stop the patient's amiodarone drip and start the

patient on metoprolol 25 mg p.o. q.12 hours.

3.  Acute renal failure, acute on chronic kidney disease at present is a

stage 3B.  The patient appears to be dehydrated.  Would recommend IV

fluids.

4.  Hypothermia.  This is most likely related to the patient's

hypothyroidism.

5.  Hypothyroidism most likely due to noncompliance and also patient

having vomited on the days that she took the thyroid replacement.

6.  Status post history of left carotid endarterectomy, states she has an

occluded left carotid artery post surgery.

7.  History of colorectal cancer in remission.

8.  Hypertension.  Blood pressure remains on the lower side.  This may be

due to the patient being dehydrated.

9.  Seizure disorder, none this admission.

10.  Nonobstructive coronary artery disease.  Note that the patient's

troponin-I is borderline positive.  This may be because of the

supraventricular tachycardia and acute renal failure.  No evidence of

non-ST-elevation myocardial infarction.



RECOMMENDATIONS:

As mentioned earlier, would stop the patient's amiodarone.  Continue the

patient on respiratory treatments and steroids and antibiotics.  Would

replace the patient's thyroid medication.  Would start the patient on

metoprolol 25 mg p.o. q.12 hours and increase the dose as tolerated.  I

suspect that the patient's blood pressure will come up once the patient is

hydrated.  We will follow with you.



Note that the patient quit smoking on 2017.



DISPOSITION:

Note that the patient is a FULL CODE.  Her  is her surrogate

healthcare decision maker.



TIME SPENT:

Note 40 minutes spent on this patient with more than 50% of the time spent

in direct patient care and review of the patient's records from [facility]

where she had a cardiac catheterization.  Also discussed with the

attending physician on the case and also the nurse taking care of the

patient in the Emergency Room.



In view of the patient's hypothyroidism, would not use amiodarone.  Would

stop the patient's IV amiodarone drip.



 





DICTATING PHYSICIAN: JAGDISH NJ M.D.





[id number]                  DT: 2017    1719

PHY#: [id number]            DD: 2017    1659

ID:   [id number]           JOB#: [id number]      ACCT: [id number]



cc:JAGDISH NJ M.D.

>

## 2017-01-08 NOTE — PROGRESS NOTE E
Progress Note



NAME: KAIT QUARLES

MRN: [id number]

: 1960             AGE: 56Y

DATE:  2017          ROOM: 325



SUBJECTIVE:

The patient states that last night she had a rash and also this morning

developed a rash on the palm of her hand.  This is thought to be due to

imipenem and clindamycin, both of which have been stopped.  The patient

states that last night her blood pressure was high and she was given some

intravenous injection, in fact, she got Vasotec 1.25 mg IV push x1.  The

patient's blood pressure is still elevated.  There is no palpitations or

recurrence of the SVT.  The patient has no chest pain.  There is no PND,

orthopnea.  Her shortness of breath is much improved.  There is no

wheezing today.  She states that yesterday she had a cough which was

productive of yellow sputum.  At present, the patient has a cough which is

nonproductive.  There is no pleuritic chest pain.  There is no hemoptysis.

There is no TIA or CVA symptoms.



OBJECTIVE:

GENERAL:  On examination, the patient is well built and well nourished  at

present in no acute distress.



VITAL SIGNS:  She is afebrile with a temperature of 97.6 degrees

Fahrenheit, pulse is 65 beats per minute, blood pressure 187/97,

respirations are 16 per minute, O2 saturations are 99% on room air.



HEAD:  Atraumatic, normocephalic.



EYES:  Pupils are equal, round, regular, and reactive to light and

accommodation.  Extraocular movements are normal.  There is no

conjunctival pallor.  There is no scleral icterus.



EARS, NOSE, THROAT:  Negative.



NECK:  Supple.  There is no JVD.  There are bilateral carotid bruits

present, left greater than right.  There is a left carotid endarterectomy

scar present.  There is no lymphadenopathy.  There is no goiter.



LUNGS:  There is diminished air entry, prolonged expiration.  There is no

rhonchi, rales, and wheezing.  Breath sounds are harsh.  On palpation,

there is hyperresonance.



HEART:  S1 and S2 are heard.  There is no S3 gallop.  There is no S4

gallop.  S1 is of normal intensity with a systolic murmur in left sternal

border on the apex.  There is no rub.



ABDOMEN:  Soft, nontender.  There is no hepatosplenomegaly.  Bowel sounds

are well heard.  There are no tender areas or masses.



EXTREMITIES:  Femorals are diminished.  Leg pulses are diminished.  There

are no DVT or cellulitis.  There are no femoral bruits.  There is no

cyanosis or clubbing.  There is no pedal edema.  There is no calf

tenderness.



CENTRAL NERVOUS SYSTEM:  The patient is conscious, awake, alert, oriented

x3 with no focal deficit.



PSYCHIATRIC:  The patient's judgment and insight are intact.  Her affect

is slightly withdrawn.  The patient does not appear to be agitated.



IMAGING:

The patient's chest x-ray shows no infiltrates or congestive heart

failure.  The heart size is normal.



LABORATORY DATA:

The patient's white count is 6800; hemoglobin is 12.1; hematocrit is 35.6;

platelet count is 136,000.



The patient's sodium is 139.9, potassium 4.1, chloride 103, CO2 is 27. 

The patient's BUN is 15, creatinine is 1.11, GFR is slightly reduced at

51, which is still stage 3 CKD, the patient's glucose was 108, calcium is

9.8.



IMPRESSION:

1.  ACUTE EXACERBATION OF CHRONIC OBSTRUCTIVE PULMONARY DISEASE.  Continue

respiratory treatment.  Continue steroids.  Note that the patient's

antibiotics have been discontinued.

2.  DRUG ALLERGY WITH RASH.  Most likely secondary to imipenem since the

patient has a history of penicillin allergy.

3.  SUPRAVENTRICULAR TACHYCARDIA.  No recurrence.

4.  ACUTE ON CHRONIC RENAL FAILURE.  GFR is at present is stage 3.

5.  HYPOTHERMIA.  This has resolved.

6.  HYPOTHYROIDISM, MOST LIKELY DUE TO NONCOMPLIANCE AND ALSO PATIENT

HAVING VOMITED ON DAYS THAT SHE TOOK THE THYROID REPLACEMENT.  Her TSH is

now improved.  The patient has been given Synthroid in the hospital.

7.  STATUS POST HISTORY OF LEFT CAROTID ENDARTERECTOMY WITH TOTAL

OCCLUSION OF THE LEFT INTERNAL CAROTID ARTERY.  The patient has no CVA

symptoms.

8.  HISTORY OF COLORECTAL CANCER, STABLE.

9.  HYPERTENSION.  Blood pressure is stable and well controlled.  Note

that the patient's blood pressure was elevated last night and she did get

a dose of Vasotec intravenously.  Will increase the patient's lisinopril

to 10 mg p.o. q. 12 hours and give 10 mg p.o. right now.  10. 10.

DIVERTICULITIS.  Patient with no complaints of abdominal pain.

 

RECOMMENDATIONS:

1.  Note that the patient's imipenem has been discontinued and the patient

is on clindamycin 300 mg q. 6 hours.

2.  She is also metoprolol, continue that.

3.  She is also on prednisone 20 mg p.o. b.i.d.

4.  She is also on potassium chloride 20 mg p.o. daily.

5.  The patient is not on a diuretic.

6.  The patient's potassium is 4.1, will stop the patient's potassium.

7.  Continue all other medications as mentioned above.



TIME SPENT:

Note, 35 minutes spent on this patient with more than 50% of the time

spent on direct patient care.  Also, the patient's medications were

reviewed and her medications were adjusted.  Also discussed with the

hospitalist taking care of the patient.  Also discussed with the patient.



We will follow with you.





DICTATING PHYSICIAN:  JAGDISH NJ M.D.





[id number]                  DT: 2017    1209

LUCASY#: [id number]            DD: 2017    1157

ID:   [id number]           JOB#: [id number]       ACCT: [id number]



cc:

>

## 2017-01-09 LAB
ANION GAP SERPL CALC-SCNC: 9 MMOL/L (ref 5–19)
BASOPHILS # BLD AUTO: 0 10^3/UL (ref 0–0.2)
BASOPHILS NFR BLD AUTO: 0.1 % (ref 0–2)
BUN SERPL-MCNC: 23 MG/DL (ref 7–20)
CALCIUM: 10 MG/DL (ref 8.4–10.2)
CHLORIDE SERPL-SCNC: 99 MMOL/L (ref 98–107)
CO2 SERPL-SCNC: 28 MMOL/L (ref 22–30)
CREAT SERPL-MCNC: 1.06 MG/DL (ref 0.52–1.25)
EOSINOPHIL # BLD AUTO: 0 10^3/UL (ref 0–0.6)
EOSINOPHIL NFR BLD AUTO: 0 % (ref 0–6)
ERYTHROCYTE [DISTWIDTH] IN BLOOD BY AUTOMATED COUNT: 14.3 % (ref 11.5–14)
GLUCOSE SERPL-MCNC: 101 MG/DL (ref 75–110)
HCT VFR BLD CALC: 37.3 % (ref 36–47)
HGB BLD-MCNC: 13.1 G/DL (ref 12–15.5)
HGB HCT DIFFERENCE: 2
LYMPHOCYTES # BLD AUTO: 1 10^3/UL (ref 0.5–4.7)
LYMPHOCYTES NFR BLD AUTO: 12.5 % (ref 13–45)
MCH RBC QN AUTO: 34 PG (ref 27–33.4)
MCHC RBC AUTO-ENTMCNC: 35.1 G/DL (ref 32–36)
MCV RBC AUTO: 97 FL (ref 80–97)
MONOCYTES # BLD AUTO: 0.4 10^3/UL (ref 0.1–1.4)
MONOCYTES NFR BLD AUTO: 5.9 % (ref 3–13)
NEUTROPHILS # BLD AUTO: 6.2 10^3/UL (ref 1.7–8.2)
NEUTS SEG NFR BLD AUTO: 81.5 % (ref 42–78)
POTASSIUM SERPL-SCNC: 4.6 MMOL/L (ref 3.6–5)
RBC # BLD AUTO: 3.85 10^6/UL (ref 3.72–5.28)
SODIUM SERPL-SCNC: 135.7 MMOL/L (ref 137–145)
WBC # BLD AUTO: 7.6 10^3/UL (ref 4–10.5)

## 2017-01-09 RX ADMIN — IPRATROPIUM BROMIDE AND ALBUTEROL SULFATE PRN ML: 2.5; .5 SOLUTION RESPIRATORY (INHALATION) at 04:23

## 2017-01-09 RX ADMIN — CLINDAMYCIN HYDROCHLORIDE SCH MG: 150 CAPSULE ORAL at 17:57

## 2017-01-09 RX ADMIN — METOPROLOL TARTRATE SCH MG: 25 TABLET, FILM COATED ORAL at 05:19

## 2017-01-09 RX ADMIN — CLINDAMYCIN HYDROCHLORIDE SCH MG: 150 CAPSULE ORAL at 05:19

## 2017-01-09 RX ADMIN — DOCUSATE SODIUM SCH MG: 100 CAPSULE, LIQUID FILLED ORAL at 10:24

## 2017-01-09 RX ADMIN — METOPROLOL TARTRATE SCH MG: 25 TABLET, FILM COATED ORAL at 17:56

## 2017-01-09 RX ADMIN — FLUTICASONE PROPIONATE SCH SPRAY: 50 SPRAY, METERED NASAL at 17:58

## 2017-01-09 RX ADMIN — HEPARIN SODIUM SCH UNIT: 5000 INJECTION, SOLUTION INTRAVENOUS; SUBCUTANEOUS at 21:18

## 2017-01-09 RX ADMIN — PREDNISONE SCH MG: 20 TABLET ORAL at 17:57

## 2017-01-09 RX ADMIN — HEPARIN SODIUM SCH UNIT: 5000 INJECTION, SOLUTION INTRAVENOUS; SUBCUTANEOUS at 05:22

## 2017-01-09 RX ADMIN — CLINDAMYCIN HYDROCHLORIDE SCH MG: 150 CAPSULE ORAL at 12:16

## 2017-01-09 RX ADMIN — ASPIRIN SCH MG: 81 TABLET, CHEWABLE ORAL at 10:24

## 2017-01-09 RX ADMIN — HYDRALAZINE HYDROCHLORIDE SCH MG: 25 TABLET, FILM COATED ORAL at 10:24

## 2017-01-09 RX ADMIN — OXYCODONE HYDROCHLORIDE PRN MG: 5 TABLET ORAL at 08:50

## 2017-01-09 RX ADMIN — LANSOPRAZOLE SCH MG: 30 TABLET, ORALLY DISINTEGRATING, DELAYED RELEASE ORAL at 05:19

## 2017-01-09 RX ADMIN — LEVOTHYROXINE SODIUM SCH MG: 150 TABLET ORAL at 05:19

## 2017-01-09 RX ADMIN — HYDRALAZINE HYDROCHLORIDE PRN MG: 20 INJECTION INTRAMUSCULAR; INTRAVENOUS at 01:13

## 2017-01-09 RX ADMIN — LISINOPRIL SCH MG: 10 TABLET ORAL at 10:23

## 2017-01-09 RX ADMIN — OXYCODONE HYDROCHLORIDE PRN MG: 5 TABLET ORAL at 17:55

## 2017-01-09 RX ADMIN — PREDNISONE SCH MG: 20 TABLET ORAL at 10:24

## 2017-01-09 RX ADMIN — HYDRALAZINE HYDROCHLORIDE SCH MG: 25 TABLET, FILM COATED ORAL at 22:00

## 2017-01-09 RX ADMIN — LIDOCAINE SCH PATCH: 50 PATCH CUTANEOUS at 10:25

## 2017-01-09 RX ADMIN — HEPARIN SODIUM SCH UNIT: 5000 INJECTION, SOLUTION INTRAVENOUS; SUBCUTANEOUS at 14:32

## 2017-01-09 RX ADMIN — DOCUSATE SODIUM SCH MG: 100 CAPSULE, LIQUID FILLED ORAL at 17:59

## 2017-01-09 RX ADMIN — LISINOPRIL SCH MG: 10 TABLET ORAL at 21:15

## 2017-01-09 RX ADMIN — ATORVASTATIN CALCIUM SCH MG: 20 TABLET, FILM COATED ORAL at 21:14

## 2017-01-09 RX ADMIN — FLUTICASONE PROPIONATE SCH SPRAY: 50 SPRAY, METERED NASAL at 10:24

## 2017-01-09 NOTE — PDOC PROGRESS REPORT
Subjective


Progress Note for:: 01/09/17


Subjective:: 


The patient states to feel much better.


She denies any further cough or shortness of breath.


Her blood pressure was not very well-controlled and she had to have some 

hydralazine.


She denies any further palpitations.





Physical Exam


Vital Signs: 


 











Temp Pulse Resp BP Pulse Ox


 


 98.1 F   70   20   139/90 H  99 


 


 01/09/17 07:37  01/09/17 07:37  01/09/17 07:37  01/09/17 07:37  01/09/17 07:37








 Intake & Output











 01/08/17 01/09/17 01/10/17





 06:59 06:59 06:59


 


Intake Total 1703 2295 


 


Output Total 2700 4650 


 


Balance -997 -2355 


 


Weight 51.6 kg 51.7 kg 











General appearance: PRESENT: mild distress


Head exam: PRESENT: atraumatic


Eye exam: PRESENT: conjunctiva pink


Neck exam: PRESENT: carotid bruit


Respiratory exam: PRESENT: rhonchi.  ABSENT: wheezes


Cardiovascular exam: PRESENT: RRR, +S1, +S2


Pulses: PRESENT: +1 pedal pulses bilateral


Vascular exam: PRESENT: normal capillary refill


GI/Abdominal exam: PRESENT: normal bowel sounds, soft


Extremities exam: PRESENT: full ROM


Musculoskeletal exam: PRESENT: ambulatory


Neurological exam: PRESENT: alert





Results


Laboratory Results: 


 





 01/09/17 04:15 





 01/09/17 04:15 





 











  01/09/17 01/09/17





  04:15 04:15


 


WBC  7.6 


 


RBC  3.85 


 


Hgb  13.1 


 


Hct  37.3 


 


MCV  97 


 


MCH  34.0 H 


 


MCHC  35.1 


 


RDW  14.3 H 


 


Plt Count  146 L 


 


Seg Neutrophils %  81.5 H 


 


Lymphocytes %  12.5 L 


 


Monocytes %  5.9 


 


Eosinophils %  0.0 


 


Basophils %  0.1 


 


Absolute Neutrophils  6.2 


 


Absolute Lymphocytes  1.0 


 


Absolute Monocytes  0.4 


 


Absolute Eosinophils  0.0 


 


Absolute Basophils  0.0 


 


Sodium   135.7 L


 


Potassium   4.6


 


Chloride   99


 


Carbon Dioxide   28


 


Anion Gap   9


 


BUN   23 H


 


Creatinine   1.06


 


Est GFR ( Amer)   > 60


 


Est GFR (Non-Af Amer)   53 L


 


Glucose   101


 


Calcium   10.0








 











  01/05/17 01/05/17 01/05/17





  07:08 07:08 12:15


 


Creatine Kinase  244 H   286 H


 


CK-MB (CK-2)   4.47 


 


Troponin I   0.107 














  01/05/17 01/05/17 01/05/17





  12:15 18:48 18:48


 


Creatine Kinase   306 H 


 


CK-MB (CK-2)  5.05 H   5.01 H


 


Troponin I  0.107   0.087














  01/06/17 01/06/17 01/06/17





  08:05 08:05 14:13


 


Creatine Kinase  299 H   278 H


 


CK-MB (CK-2)   3.20 


 


Troponin I   0.052 














  01/06/17 01/06/17 01/06/17





  14:13 20:35 20:35


 


Creatine Kinase    228 H


 


CK-MB (CK-2)  2.87  2.15 


 


Troponin I  0.027  0.018 











Impressions: 


 





Limited or Localized CT  01/05/17 03:21


IMPRESSION:  CHRONIC ATROPHIC LEFT KIDNEY WITH STABLE CORTICAL CYSTS.  NO 

SIGNIFICANT OR ACUTE PROCESS IN THE ABDOMEN OR PELVIS.


 








Chest X-Ray  01/08/17 06:00


IMPRESSION:  NO ACUTE RADIOGRAPHIC FINDING IN THE CHEST.


 














Assessment & Plan





- Diagnosis


(1) AVNRT (AV denisa re-entry tachycardia)


Is this a current diagnosis for this admission?: YesPlan: 


Resolved and controlled with medication








(2) Acute kidney injury superimposed on chronic kidney disease


Is this a current diagnosis for this admission?: YesPlan: 


Back to baseline with rehydration








(3) Acute renal failure





Is this a current diagnosis for this admission?: Yes





(4) COPD exacerbation


Is this a current diagnosis for this admission?: YesPlan: 


Stable and improving with medications with steroids and antibiotics.








(5) Hypothermia


Qualifiers: 


     Encounter type: initial encounter     Qualified Code(s): T68.XXXA - 

Hypothermia, initial encounter  


Plan: 


Resolved most probably secondary to noncompliance with thyroid medication/

hypothyroidism and upper respiratory tract symptoms with COPD exacerbation








(6) SVT (supraventricular tachycardia)


Is this a current diagnosis for this admission?: Yes





(7) Hypothyroidism


Qualifiers: 


     Hypothyroidism type: unspecified        Qualified Code(s): E03.9 - 

Hypothyroidism, unspecified  


Is this a current diagnosis for this admission?: YesPlan: 


Improving with TSH down to 10.

## 2017-01-09 NOTE — PROGRESS NOTE E
Progress Note



NAME: KAIT QUARLES

MRN: [id number]

: 1960             AGE: 56Y

DATE:  2017          ROOM: 325



SUBJECTIVE:

The patient denies any chest pain or discomfort.  There is no PND,

orthopnea, or leg edema.  There is no shortness of breath.  The patient

denies any palpitations.  There is no recurrence of SVT on the monitor. 

There is no ventricular arrhythmias on the monitor.  There is no TIA or

CVA symptoms.  The patient appears to be cheerful.  Earlier her blood

pressure this morning at 1 o'clock was 169/93 and the patient is worried

about her blood pressure.



OBJECTIVE:

GENERAL:  At present, the patient is in no acute distress.  She is well

built and well nourished.



VITAL SIGNS:  She is afebrile with a temperature of 98.1 degrees

Fahrenheit, pulse is 70 beats per minute, blood pressure 139/90,

respirations are 20per minute, O2 saturations are 99% on 1 liter nasal

cannula.



HEAD:  Atraumatic, normocephalic.



EYES:  Pupils are equal, round, regular, and reactive to light and

accommodation.  Extraocular movements are normal.  There is no

conjunctival pallor.  There is no scleral icterus.



EARS, NOSE, THROAT:  Negative.



NECK:  Supple.  There is no JVD.  Carotids are equal.  There are bilateral

carotid bruits present.  There is no carotid delay.  There is no

lymphadenopathy.  There is no goiter.  Trachea is central.



LUNGS:  Show diminished air entry, prolonged expiration.  There is no

rhonchi, rales, and wheezing.  On percussion, there is hyperresonance.



HEART:  S1 and S2 are heard.  There is no S3 gallop.  There is no S4

gallop.  There is a systolic murmur in left sternal border on the apex. 

There is no rub.



ABDOMEN:  Soft, nontender.  There is no hepatosplenomegaly.  Bowel sounds

are well heard.  There are no tender areas or masses.



EXTREMITIES:  Femorals are diminished.  Leg pulses are diminished.  There

is no DVT or cellulitis.  There are no femoral bruits.  There is no

cyanosis or clubbing.  There is no pedal edema.  There is no calf

tenderness.



CENTRAL NERVOUS SYSTEM:  The patient is conscious, awake, alert, oriented

x3 with no focal deficit.



PSYCHIATRIC:  The patient's judgment and insight are intact.  Her affect

is slightly normal.



LABORATORY DATA:

The patient's white count is 7600; hemoglobin is 13.1; hematocrit is 37.3;

platelet count is 146,000.



The patient's sodium is 135.7, potassium 4.6, chloride 99, CO2 is 28.  The

patient's BUN is 23, creatinine is 1.06, GFR is reduced at 53 mL, , which

is still stage IIIa chronic kidney disease and hence, back to baseline. 

Glucose was 101, calcium is 10.



IMPRESSION:

1.  ACUTE EXACERBATION OF CHRONIC OBSTRUCTIVE PULMONARY DISEASE. Seems to

be resolved.  Patient is back to baseline.

2.  DRUG ALLERGY WITH RASH.  Subsided after imipenem was stopped.

3.  SUPRAVENTRICULAR TACHYCARDIA.  No recurrence.

4.  ACUTE ON CHRONIC RENAL FAILURE.  GFR is at present back to baseline

which is chronic kidney disease stage IIIa.

5.  HYPOTHERMIA.  Most likely secondary to hypothyroid.  This is resolved.

6.  HYPOTHYROIDISM, MOST LIKELY DUE TO NONCOMPLIANCE AND AT PRESENT THE

TSH IS COMING DOWN WITH THE PATIENT BEING REPLACED WITH THYROID.  The last

TSH was 10 compared with admission TSH of 35.

7.  STATUS POST HISTORY OF LEFT CAROTID ENDARTERECTOMY WITH TOTAL

OCCLUSION OF THE LEFT INTERNAL CAROTID ARTERY.  The patient has no CVA

symptoms.

8.  HISTORY OF COLORECTAL CANCER, STABLE, IN REMISSION.

9.  HYPERTENSION.  Blood pressure is stable and seems at times not to be

well controlled.  Would recommend continue the patient on metoprolol for

*------* hypertension.  Continue the patient on lisinopril 20 mg p.o. q.

12 hours.  Would also place the patient on hydralazine 25 mg p.o. q. 12

hours.



Patient's cardiac status is stable.  Will sign off the case as discussed

with Dr. Munoz.  Will follow up the patient in the office since she

desires to follow up with me.  All questions answered.



TIME SPENT:

Note, 35 minutes spent on this patient with more than 50% of the time

spent on direct patient care, review of the patient's medications, adding

medications, and also discussions with the attending physician on the

case.



We follow the patient in the office since the patient desires to follow up

with me.







DICTATING PHYSICIAN:  JAGDISH NJ M.D.





[id number]                  DT: 2017    [id number]

PHY#: [id number]            DD: 2017    1253

ID:   [id number]           JOB#: [id number]       ACCT: [id number]



cc:

>

## 2017-01-10 VITALS — SYSTOLIC BLOOD PRESSURE: 145 MMHG | DIASTOLIC BLOOD PRESSURE: 81 MMHG

## 2017-01-10 LAB
ALBUMIN SERPL-MCNC: 3.6 G/DL (ref 3.5–5)
ALP SERPL-CCNC: 49 U/L (ref 38–126)
ALT SERPL-CCNC: 26 U/L (ref 9–52)
ANION GAP SERPL CALC-SCNC: 10 MMOL/L (ref 5–19)
AST SERPL-CCNC: 18 U/L (ref 14–36)
BASOPHILS # BLD AUTO: 0 10^3/UL (ref 0–0.2)
BASOPHILS NFR BLD AUTO: 0.3 % (ref 0–2)
BILIRUB DIRECT SERPL-MCNC: 0 MG/DL (ref 0–0.3)
BILIRUB SERPL-MCNC: 0.4 MG/DL (ref 0.2–1.3)
BUN SERPL-MCNC: 31 MG/DL (ref 7–20)
CALCIUM: 9.6 MG/DL (ref 8.4–10.2)
CHLORIDE SERPL-SCNC: 97 MMOL/L (ref 98–107)
CO2 SERPL-SCNC: 29 MMOL/L (ref 22–30)
CREAT SERPL-MCNC: 1.09 MG/DL (ref 0.52–1.25)
EOSINOPHIL # BLD AUTO: 0 10^3/UL (ref 0–0.6)
EOSINOPHIL NFR BLD AUTO: 0.1 % (ref 0–6)
ERYTHROCYTE [DISTWIDTH] IN BLOOD BY AUTOMATED COUNT: 13.9 % (ref 11.5–14)
GLUCOSE SERPL-MCNC: 102 MG/DL (ref 75–110)
HCT VFR BLD CALC: 39.8 % (ref 36–47)
HGB BLD-MCNC: 13.7 G/DL (ref 12–15.5)
HGB HCT DIFFERENCE: 1.3
LYMPHOCYTES # BLD AUTO: 1.2 10^3/UL (ref 0.5–4.7)
LYMPHOCYTES NFR BLD AUTO: 15.7 % (ref 13–45)
MAGNESIUM SERPL-MCNC: 1.9 MG/DL (ref 1.6–2.3)
MCH RBC QN AUTO: 33.8 PG (ref 27–33.4)
MCHC RBC AUTO-ENTMCNC: 34.3 G/DL (ref 32–36)
MCV RBC AUTO: 98 FL (ref 80–97)
MONOCYTES # BLD AUTO: 0.5 10^3/UL (ref 0.1–1.4)
MONOCYTES NFR BLD AUTO: 6.7 % (ref 3–13)
NEUTROPHILS # BLD AUTO: 6.1 10^3/UL (ref 1.7–8.2)
NEUTS SEG NFR BLD AUTO: 77.2 % (ref 42–78)
POTASSIUM SERPL-SCNC: 4.3 MMOL/L (ref 3.6–5)
PROT SERPL-MCNC: 6.5 G/DL (ref 6.3–8.2)
RBC # BLD AUTO: 4.05 10^6/UL (ref 3.72–5.28)
SODIUM SERPL-SCNC: 135.8 MMOL/L (ref 137–145)
WBC # BLD AUTO: 7.9 10^3/UL (ref 4–10.5)

## 2017-01-10 RX ADMIN — LIDOCAINE SCH PATCH: 50 PATCH CUTANEOUS at 09:42

## 2017-01-10 RX ADMIN — OXYCODONE HYDROCHLORIDE PRN MG: 5 TABLET ORAL at 07:55

## 2017-01-10 RX ADMIN — DOCUSATE SODIUM SCH MG: 100 CAPSULE, LIQUID FILLED ORAL at 09:40

## 2017-01-10 RX ADMIN — LEVOTHYROXINE SODIUM SCH MG: 150 TABLET ORAL at 05:03

## 2017-01-10 RX ADMIN — METOPROLOL TARTRATE SCH MG: 25 TABLET, FILM COATED ORAL at 05:01

## 2017-01-10 RX ADMIN — CLINDAMYCIN HYDROCHLORIDE SCH MG: 150 CAPSULE ORAL at 05:01

## 2017-01-10 RX ADMIN — LISINOPRIL SCH MG: 10 TABLET ORAL at 09:41

## 2017-01-10 RX ADMIN — PREDNISONE SCH MG: 20 TABLET ORAL at 09:40

## 2017-01-10 RX ADMIN — HEPARIN SODIUM SCH UNIT: 5000 INJECTION, SOLUTION INTRAVENOUS; SUBCUTANEOUS at 05:03

## 2017-01-10 RX ADMIN — CLINDAMYCIN HYDROCHLORIDE SCH MG: 150 CAPSULE ORAL at 00:35

## 2017-01-10 RX ADMIN — LANSOPRAZOLE SCH MG: 30 TABLET, ORALLY DISINTEGRATING, DELAYED RELEASE ORAL at 05:01

## 2017-01-10 RX ADMIN — ASPIRIN SCH MG: 81 TABLET, CHEWABLE ORAL at 09:40

## 2017-01-10 RX ADMIN — HYDRALAZINE HYDROCHLORIDE SCH MG: 25 TABLET, FILM COATED ORAL at 09:40

## 2017-01-10 RX ADMIN — OXYCODONE HYDROCHLORIDE PRN MG: 5 TABLET ORAL at 01:00

## 2017-01-10 RX ADMIN — FLUTICASONE PROPIONATE SCH SPRAY: 50 SPRAY, METERED NASAL at 09:45

## 2017-01-10 NOTE — PDOC DISCHARGE SUMMARY
General





- Admit/Disc Date/PCP


Admission Date/Primary Care Provider: 


  01/05/17 06:28





  KEVAN MERA, 





Discharge Date: 01/10/17





- Discharge Diagnosis


(1) AVNRT (AV denisa re-entry tachycardia)


Is this a current diagnosis for this admission?: YesSummary: 


Her SVT has resolved and is presently controlled with medications.  We will 

continue same medications as an outpatient.








(2) Acute kidney injury superimposed on chronic kidney disease


Is this a current diagnosis for this admission?: YesSummary: 


Resolved with rehydration and is presently well-controlled








(3) Acute renal failure


Is this a current diagnosis for this admission?: Yes





(4) COPD exacerbation


Is this a current diagnosis for this admission?: YesSummary: 


Improved with antibiotics and steroids








(5) Hypothermia


Summary: 


Most probably related to her presenting symptoms and noncompliance with thyroid 

treatment








(6) SVT (supraventricular tachycardia)


Is this a current diagnosis for this admission?: Yes





(7) Hypothyroidism


Is this a current diagnosis for this admission?: Yes








- Additional Information


Resuscitation Status: Full Code


Discharge Diet: Cardiac


Discharge Activity: Activity As Tolerated


Home Medications: 








Aspirin [Aspirin 81 mg Chewable Tablet] 81 mg PO DAILY 01/05/17 


Levothyroxine Sodium [Synthroid 0.15 mg Tablet] 150 mcg PO QAM 01/05/17 


Losartan Potassium 50 mg PO DAILY 01/05/17 


Metoprolol Tartrate 25 mg PO BID 01/05/17 


Atorvastatin Calcium 20 mg PO QHS #30 tablet 01/10/17 


Clindamycin HCl [Cleocin 150 mg Capsule] 300 mg PO Q6 #10 capsule 01/10/17 


Lansoprazole [Prevacid 30 mg Odt Tablet] 30 mg PO Q6AM #30 tab.rap.dr 01/10/17 


Lidocaine [Lidoderm 5% (700 mg) Transdermal Patch] 1 patch TP DAILY #0 

adh..patch 01/10/17 


Oxycodone HCl [Oxy-Ir 5 mg Tablet] 5 mg PO Q4HP PRN #0 tablet 01/10/17 


Prednisone [Deltasone 20 mg Tablet] 20 mg PO BID #10 tablet 01/10/17 











History of Present Illness


History of Present Illness: 


KAIT QUARLES is a 56 year old female








Hospital Course


Hospital Course: 


The patient did well.  She was aggressively treated in the emergency room in 

order to break her SVT and needed to be cardioverted several times.  She did 

quite well after the hospitalization admission.  She was seen by cardiologist 

in several medications have been changed.  She was restarted on her Synthroid 

with significant improvement in her symptomatology





Physical Exam


Vital Signs: 


 











Temp Pulse Resp BP Pulse Ox


 


 97.5 F   60   18   145/81 H  99 


 


 01/10/17 10:40  01/10/17 10:40  01/10/17 10:40  01/10/17 10:40  01/10/17 10:40








 Intake & Output











 01/09/17 01/10/17 01/11/17





 06:59 06:59 06:59


 


Intake Total 2295 2041 


 


Output Total 4650 1300 


 


Balance -2355 741 


 


Weight 51.7 kg 49.7 kg 











General appearance: PRESENT: no acute distress


Head exam: PRESENT: atraumatic


Eye exam: PRESENT: conjunctiva pink


Respiratory exam: PRESENT: rhonchi


Cardiovascular exam: PRESENT: RRR, +S1, +S2


Pulses: PRESENT: other


Vascular exam: PRESENT: normal capillary refill


GI/Abdominal exam: PRESENT: normal bowel sounds, soft


Extremities exam: PRESENT: full ROM


Musculoskeletal exam: PRESENT: ambulatory


Neurological exam: PRESENT: alert, awake, oriented to time





Results


Laboratory Results: 


 





 01/10/17 05:10 





 01/10/17 05:10 





 











  01/10/17 01/10/17 01/10/17





  05:10 05:10 05:10


 


WBC  7.9  


 


RBC  4.05  


 


Hgb  13.7  


 


Hct  39.8  


 


MCV  98 H  


 


MCH  33.8 H  


 


MCHC  34.3  


 


RDW  13.9  


 


Plt Count  164  


 


Seg Neutrophils %  77.2  


 


Lymphocytes %  15.7  


 


Monocytes %  6.7  


 


Eosinophils %  0.1  


 


Basophils %  0.3  


 


Absolute Neutrophils  6.1  


 


Absolute Lymphocytes  1.2  


 


Absolute Monocytes  0.5  


 


Absolute Eosinophils  0.0  


 


Absolute Basophils  0.0  


 


Sodium   135.8 L 


 


Potassium   4.3 


 


Chloride   97 L 


 


Carbon Dioxide   29 


 


Anion Gap   10 


 


BUN   31 H 


 


Creatinine   1.09 


 


Est GFR ( Amer)   > 60 


 


Est GFR (Non-Af Amer)   52 L 


 


Glucose   102 


 


Calcium   9.6 


 


Magnesium   1.9 


 


Total Bilirubin   0.4 


 


AST   18 


 


ALT   26 


 


Alkaline Phosphatase   49 


 


Total Protein   6.5 


 


Albumin   3.6 


 


TSH    7.37 H








 











  01/05/17 01/05/17 01/05/17





  07:08 07:08 12:15


 


Creatine Kinase  244 H   286 H


 


CK-MB (CK-2)   4.47 


 


Troponin I   0.107 














  01/05/17 01/05/17 01/05/17





  12:15 18:48 18:48


 


Creatine Kinase   306 H 


 


CK-MB (CK-2)  5.05 H   5.01 H


 


Troponin I  0.107   0.087














  01/06/17 01/06/17 01/06/17





  08:05 08:05 14:13


 


Creatine Kinase  299 H   278 H


 


CK-MB (CK-2)   3.20 


 


Troponin I   0.052 














  01/06/17 01/06/17 01/06/17





  14:13 20:35 20:35


 


Creatine Kinase    228 H


 


CK-MB (CK-2)  2.87  2.15 


 


Troponin I  0.027  0.018 











Impressions: 


 





Limited or Localized CT  01/05/17 03:21


IMPRESSION:  CHRONIC ATROPHIC LEFT KIDNEY WITH STABLE CORTICAL CYSTS.  NO 

SIGNIFICANT OR ACUTE PROCESS IN THE ABDOMEN OR PELVIS.


 








Chest X-Ray  01/08/17 06:00


IMPRESSION:  NO ACUTE RADIOGRAPHIC FINDING IN THE CHEST.

## 2017-02-10 ENCOUNTER — HOSPITAL ENCOUNTER (INPATIENT)
Dept: HOSPITAL 62 - ER | Age: 57
LOS: 4 days | Discharge: HOME | DRG: 309 | End: 2017-02-14
Attending: INTERNAL MEDICINE | Admitting: INTERNAL MEDICINE
Payer: MEDICARE

## 2017-02-10 DIAGNOSIS — Z88.8: ICD-10-CM

## 2017-02-10 DIAGNOSIS — Z88.1: ICD-10-CM

## 2017-02-10 DIAGNOSIS — R10.9: ICD-10-CM

## 2017-02-10 DIAGNOSIS — R65.10: ICD-10-CM

## 2017-02-10 DIAGNOSIS — F17.210: ICD-10-CM

## 2017-02-10 DIAGNOSIS — R19.7: ICD-10-CM

## 2017-02-10 DIAGNOSIS — M10.9: ICD-10-CM

## 2017-02-10 DIAGNOSIS — E03.9: ICD-10-CM

## 2017-02-10 DIAGNOSIS — I47.1: Primary | ICD-10-CM

## 2017-02-10 DIAGNOSIS — I25.10: ICD-10-CM

## 2017-02-10 DIAGNOSIS — B18.2: ICD-10-CM

## 2017-02-10 DIAGNOSIS — G40.909: ICD-10-CM

## 2017-02-10 DIAGNOSIS — Z88.0: ICD-10-CM

## 2017-02-10 DIAGNOSIS — E86.0: ICD-10-CM

## 2017-02-10 DIAGNOSIS — I10: ICD-10-CM

## 2017-02-10 DIAGNOSIS — B96.20: ICD-10-CM

## 2017-02-10 DIAGNOSIS — J44.9: ICD-10-CM

## 2017-02-10 DIAGNOSIS — C21.0: ICD-10-CM

## 2017-02-10 DIAGNOSIS — Z79.82: ICD-10-CM

## 2017-02-10 DIAGNOSIS — I16.0: ICD-10-CM

## 2017-02-10 DIAGNOSIS — N39.0: ICD-10-CM

## 2017-02-10 LAB
ADD ON TESTING BLD IN LAB: (no result)
ALBUMIN SERPL-MCNC: 4.7 G/DL (ref 3.5–5)
ALP SERPL-CCNC: 105 U/L (ref 38–126)
ALT SERPL-CCNC: 37 U/L (ref 9–52)
ANION GAP SERPL CALC-SCNC: 17 MMOL/L (ref 5–19)
APPEARANCE UR: CLEAR
AST SERPL-CCNC: 29 U/L (ref 14–36)
BASE EXCESS BLDV CALC-SCNC: -1.3 MMOL/L
BASOPHILS # BLD AUTO: 0 10^3/UL (ref 0–0.2)
BASOPHILS NFR BLD AUTO: 0.5 % (ref 0–2)
BILIRUB DIRECT SERPL-MCNC: 0 MG/DL (ref 0–0.3)
BILIRUB SERPL-MCNC: 0.6 MG/DL (ref 0.2–1.3)
BILIRUB UR QL STRIP: NEGATIVE
BUN SERPL-MCNC: 18 MG/DL (ref 7–20)
CALCIUM: 10.3 MG/DL (ref 8.4–10.2)
CHLORIDE SERPL-SCNC: 103 MMOL/L (ref 98–107)
CK MB SERPL-MCNC: 1.14 NG/ML (ref ?–4.55)
CK SERPL-CCNC: 63 U/L (ref 30–135)
CO2 SERPL-SCNC: 18 MMOL/L (ref 22–30)
CREAT SERPL-MCNC: 0.95 MG/DL (ref 0.52–1.25)
EOSINOPHIL # BLD AUTO: 0 10^3/UL (ref 0–0.6)
EOSINOPHIL NFR BLD AUTO: 0.2 % (ref 0–6)
ERYTHROCYTE [DISTWIDTH] IN BLOOD BY AUTOMATED COUNT: 14.2 % (ref 11.5–14)
GLUCOSE SERPL-MCNC: 164 MG/DL (ref 75–110)
GLUCOSE UR STRIP-MCNC: 50 MG/DL
HCO3 BLDV-SCNC: 19.8 MMOL/L (ref 20–32)
HCT VFR BLD CALC: 45.1 % (ref 36–47)
HGB BLD-MCNC: 15.4 G/DL (ref 12–15.5)
HGB HCT DIFFERENCE: 1.1
KETONES UR STRIP-MCNC: (no result) MG/DL
LYMPHOCYTES # BLD AUTO: 1.3 10^3/UL (ref 0.5–4.7)
LYMPHOCYTES NFR BLD AUTO: 13.4 % (ref 13–45)
MAGNESIUM SERPL-MCNC: 1.9 MG/DL (ref 1.6–2.3)
MCH RBC QN AUTO: 33.7 PG (ref 27–33.4)
MCHC RBC AUTO-ENTMCNC: 34.3 G/DL (ref 32–36)
MCV RBC AUTO: 99 FL (ref 80–97)
MONOCYTES # BLD AUTO: 0.3 10^3/UL (ref 0.1–1.4)
MONOCYTES NFR BLD AUTO: 3.3 % (ref 3–13)
NEUTROPHILS # BLD AUTO: 8 10^3/UL (ref 1.7–8.2)
NEUTS SEG NFR BLD AUTO: 82.6 % (ref 42–78)
NITRITE UR QL STRIP: NEGATIVE
PCO2 BLDV: 25.8 MMHG (ref 35–63)
PH BLDV: 7.5 [PH] (ref 7.3–7.42)
PH UR STRIP: 7 [PH] (ref 5–9)
POTASSIUM SERPL-SCNC: 5 MMOL/L (ref 3.6–5)
PROT SERPL-MCNC: 8.7 G/DL (ref 6.3–8.2)
PROT UR STRIP-MCNC: 100 MG/DL
PROTHROMBIN TIME: 12.4 SEC (ref 11.4–15.4)
RBC # BLD AUTO: 4.57 10^6/UL (ref 3.72–5.28)
SODIUM SERPL-SCNC: 137.8 MMOL/L (ref 137–145)
SP GR UR STRIP: 1.01
TROPONIN I SERPL-MCNC: < 0.012 NG/ML
UROBILINOGEN UR-MCNC: NEGATIVE MG/DL (ref ?–2)
WBC # BLD AUTO: 9.7 10^3/UL (ref 4–10.5)

## 2017-02-10 PROCEDURE — 74174 CTA ABD&PLVS W/CONTRAST: CPT

## 2017-02-10 PROCEDURE — 87086 URINE CULTURE/COLONY COUNT: CPT

## 2017-02-10 PROCEDURE — 87045 FECES CULTURE AEROBIC BACT: CPT

## 2017-02-10 PROCEDURE — 80053 COMPREHEN METABOLIC PANEL: CPT

## 2017-02-10 PROCEDURE — 80307 DRUG TEST PRSMV CHEM ANLYZR: CPT

## 2017-02-10 PROCEDURE — 87040 BLOOD CULTURE FOR BACTERIA: CPT

## 2017-02-10 PROCEDURE — 85610 PROTHROMBIN TIME: CPT

## 2017-02-10 PROCEDURE — 87186 SC STD MICRODIL/AGAR DIL: CPT

## 2017-02-10 PROCEDURE — 71275 CT ANGIOGRAPHY CHEST: CPT

## 2017-02-10 PROCEDURE — 80048 BASIC METABOLIC PNL TOTAL CA: CPT

## 2017-02-10 PROCEDURE — 71010: CPT

## 2017-02-10 PROCEDURE — 83735 ASSAY OF MAGNESIUM: CPT

## 2017-02-10 PROCEDURE — 87088 URINE BACTERIA CULTURE: CPT

## 2017-02-10 PROCEDURE — 83690 ASSAY OF LIPASE: CPT

## 2017-02-10 PROCEDURE — 82553 CREATINE MB FRACTION: CPT

## 2017-02-10 PROCEDURE — 82550 ASSAY OF CK (CPK): CPT

## 2017-02-10 PROCEDURE — 84484 ASSAY OF TROPONIN QUANT: CPT

## 2017-02-10 PROCEDURE — 85025 COMPLETE CBC W/AUTO DIFF WBC: CPT

## 2017-02-10 PROCEDURE — 83605 ASSAY OF LACTIC ACID: CPT

## 2017-02-10 PROCEDURE — 99291 CRITICAL CARE FIRST HOUR: CPT

## 2017-02-10 PROCEDURE — 85027 COMPLETE CBC AUTOMATED: CPT

## 2017-02-10 PROCEDURE — 87205 SMEAR GRAM STAIN: CPT

## 2017-02-10 PROCEDURE — 82803 BLOOD GASES ANY COMBINATION: CPT

## 2017-02-10 PROCEDURE — 93005 ELECTROCARDIOGRAM TRACING: CPT

## 2017-02-10 PROCEDURE — 81001 URINALYSIS AUTO W/SCOPE: CPT

## 2017-02-10 PROCEDURE — 89055 LEUKOCYTE ASSESSMENT FECAL: CPT

## 2017-02-10 PROCEDURE — 36415 COLL VENOUS BLD VENIPUNCTURE: CPT

## 2017-02-10 PROCEDURE — 93010 ELECTROCARDIOGRAM REPORT: CPT

## 2017-02-10 PROCEDURE — 84443 ASSAY THYROID STIM HORMONE: CPT

## 2017-02-10 NOTE — ER DOCUMENT REPORT
ED General





- General


Time seen by provider: 19:35


Mode of Arrival: Ambulatory


Information source: Patient, Relative - spouse


TRAVEL OUTSIDE OF THE [place] IN LAST 30 DAYS: No





- HPI


Onset: Other - see HPI notes


Onset/Duration: Persistent


Associated symptoms: Chills, Nausea, Vomiting





<SARAY FLORES - Last Filed: 02/10/17 21:52>





<SLY BARR - Last Filed: 02/10/17 22:41>





- General


Stated Complaint: ABDOMINAL PAIN


Notes: 


Patient is a 56-year-old female presenting to emergency department with 

complaint of abdominal pain.  Patient was brought back to room because she was 

tachycardic in the 190s.  Patient states that she has had vomiting, diarrhea, 

and abdominal pain today.  Patient did not take any of her medications today (

metoprolol and losartan) because she was vomiting.  Patient complains of having 

some chills.  Patient also complains of some difficulty breathing but is 100% 

O2 sat on room air.  Patient states she has some chest pain that she describes 

as tightness which is new.  Spouse states that patient was seen on 1/5/17 for 

similar symptoms and was admitted for diverticulitis and possible sepsis. 

Patient is currently in remediation for anal cancer. Patient has a history of 

COPD, hypertension, CAD, hypercholesterolemia, hypothyroidism, and hepatitis C. 

Patient's blood pressure during exam is 202/128. Patient was given metoprolol 

to reduce her heart rate. Patient is not able to give medical history because 

of her unstable vital signs.  (SARAY FLORES)





- Related Data


Allergies/Adverse Reactions: 


 





ciprofloxacin [From Cipro] Allergy (Severe, Verified 12/30/16 08:46)


 


Penicillins Allergy (Severe, Verified 12/30/16 08:46)


 


cilastatin [From Primaxin IV] Allergy (Mild, Verified 01/07/17 13:13)


 


imipenem [From Primaxin IV] Allergy (Mild, Verified 01/07/17 13:13)


 


cephalexin monohydrate [From Keflex] Adverse Reaction (Verified 12/30/16 08:46)


 








Home Medications: 


 Current Home Medications





Aspirin [Aspirin 81 mg Chewable Tablet] 81 mg PO DAILY 02/10/17 [History]


Atorvastatin Calcium [Lipitor 20 mg Tablet] 20 mg PO QHS 02/10/17 [History]


Ipratropium/Albuterol Sulfate [Combivent Respimat Inhal Spray] 1 puff IH QIDP 

PRN 02/10/17 [History]


Levothyroxine Sodium [Synthroid 0.15 mg Tablet] 150 mcg PO DAILY 02/10/17 [

History]


Losartan Potassium [Cozaar 50 mg Tablet] 50 mg PO DAILY 02/10/17 [History]


Metoprolol Tartrate [Lopressor 25 mg Tablet] 25 mg PO Q12 02/10/17 [History]











Past Medical History





- General


Information source: [facility] Records


Cannot obtain history due to: Unstable vital signs





- Social History


Smoking Status: Current Every Day Smoker


Family History: None





- Past Medical History


Cardiac Medical History: Reports: Hx Coronary Artery Disease, Hx 

Hypercholesterolemia, Hx Hypertension


Pulmonary Medical History: Reports: Hx Asthma, Hx COPD


Neurological Medical History: Reports: Hx Seizures


Endocrine Medical History: Reports: Hx Hypothyroidism - S/P thyroidectomy


Malignancy Medical History: Reports: Hx Colorectal Cancer


GI Medical History: Reports: Hx Hepatitis - Hep C


Musculoskeltal Medical History: Reports Hx Arthritis, Reports Hx Gout


Infectious Medical History: Reports: Hx Hepatitis - Hep C


Past Surgical History: Reports: Hx Appendectomy, Hx Carotid Endarterectomy, Hx 

Orthopedic Surgery - L hip replacement 8/2012, Hx Thyroid Surgery





- Immunizations


Immunizations up to date: Yes


Hx Diphtheria, Pertussis, Tetanus Vaccination: Yes


Hx Pneumococcal Vaccination: 01/01/10





<SARAY FLORES - Last Filed: 02/10/17 21:52>





Review of Systems





- Review of Systems


Constitutional: See HPI, Chills


EENT: No symptoms reported


Cardiovascular: See HPI, Heart racing


Respiratory: No symptoms reported


Gastrointestinal: See HPI, Abdominal pain, Diarrhea, Nausea, Vomiting


Genitourinary: No symptoms reported


Female Genitourinary: No symptoms reported


Musculoskeletal: No symptoms reported


Skin: No symptoms reported


Hematologic/Lymphatic: No symptoms reported


Neurological/Psychological: No symptoms reported


-: Yes All other systems reviewed and negative





<SARAY FLORES - Last Filed: 02/10/17 21:52>





Physical Exam





- Vital signs


Interpretation: Hypertensive, Tachycardic





- General


General appearance: Alert


In distress: Severe





- HEENT


Head: Normocephalic, Atraumatic


Eyes: Normal


Pupils: PERRL


Mucous membranes: Moist





- Respiratory


Respiratory status: No respiratory distress


Chest status: Nontender - port in right chest wall


Breath sounds: Normal


Chest palpation: Normal





- Cardiovascular


Rhythm: Regular, Tachycardia


Heart sounds: Normal auscultation


Murmur: No





- Abdominal


Inspection: Normal


Distension: No distension


Bowel sounds: Normal


Tenderness: Tender - diffuse abdominal tenderness to palpation


Organomegaly: No organomegaly





- Back


Back: Normal, Nontender





- Extremities


General upper extremity: Normal inspection, Normal ROM, Normal strength


General lower extremity: Normal inspection, Normal ROM, Normal strength





- Neurological


Neuro grossly intact: Yes


Cognition: Normal


Orientation: AAOx4


[place] Coma Scale Eye Opening: Spontaneous


[place] Coma Scale Verbal: Oriented


Any Coma Scale Motor: Obeys Commands


[place] Coma Scale Total: 15


Speech: Normal





- Psychological


Associated symptoms: Normal affect, Normal mood





- Skin


Skin Temperature: Hot


Skin Moisture: Diaphoretic





<SARAY FLORES - Last Filed: 02/10/17 21:52>





Course





- Laboratory


Result Diagrams: 


 02/10/17 19:41





 02/10/17 19:41





- Consults


  ** Dr. Mosqueda


Time consulted: 21:30


Consulted provider: will see as inpatient





<SARAY FLORES - Last Filed: 02/10/17 21:52>





- Laboratory


Result Diagrams: 


 02/10/17 19:41





 02/10/17 19:41





<SLY BARR - Last Filed: 02/10/17 22:41>





- Re-evaluation


Re-evalutation: 


02/10/17 


Patient is a 56-year-old female who comes in with tachycardia in the 190s.  

Patient is complaining of chest and abdominal pain.  Blood work is fairly 

unremarkable except for an elevated lactate.  This is likely from tachycardia.  

No evidence for infection at this time.  CT with no acute findings.  Patient 

was given metoprolol to bring her heart rate down.  She's been started on a 

labetalol drip.  The patient was unable to take her medications today due to 

vomiting and diarrhea.  Patient has been given Zofran and morphine.  She is now 

keeping ice chips down.  Patient has been discussed with the hospitalist 

service and will be admitted.  Understands agrees with plan.  Stable at time of 

admission.


 (SLY BARR)





- Vital Signs


Vital signs: 


 











Temp Pulse Resp BP Pulse Ox


 


       12   190/120 H  100 


 


       02/10/17 21:16  02/10/17 21:16  02/10/17 21:16











 (SLY BARR)





- Laboratory


Laboratory results interpreted by me: 


 











  02/10/17 02/10/17 02/10/17





  19:41 19:41 19:41


 


MCV  99 H  


 


MCH  33.7 H  


 


RDW  14.2 H  


 


Seg Neutrophils %  82.6 H  


 


VBG pH    7.50 H


 


VBG pCO2    25.8 L


 


VBG HCO3    19.8 L


 


Carbon Dioxide   18 L 


 


Glucose   164 H 


 


Lactic Acid   


 


Calcium   10.3 H 


 


Total Protein   8.7 H 


 


Urine Protein   


 


Urine Glucose (UA)   


 


Urine Ketones   














  02/10/17 02/10/17





  20:11 20:21


 


MCV  


 


MCH  


 


RDW  


 


Seg Neutrophils %  


 


VBG pH  


 


VBG pCO2  


 


VBG HCO3  


 


Carbon Dioxide  


 


Glucose  


 


Lactic Acid   4.3 H


 


Calcium  


 


Total Protein  


 


Urine Protein  100 H 


 


Urine Glucose (UA)  50 H 


 


Urine Ketones  TRACE H 











 (SLY BARR)





- Consults


  ** Dr. Mosqueda


Reason for consultation: 


02/10/17 21:30


Discussed patient with Dr. Mosqueda, patient will be admitted.











 (SARAY FLORES)





Critical Care Note





- Critical Care Note


Total time excluding time spent on procedures (mins): 45 - evaluation and 

management of tachycardia, difficulty breathing, chest pain, multiple re-

evaluations, initiation of IV drip medication, multiple re-evaluations, 

coordination of admission





<SLY BARR - Last Filed: 02/10/17 22:41>





Discharge





<SARAY FLORES - Last Filed: 02/10/17 21:52>





- Discharge


Admitting Provider: Hospitalist - Mosqueda


Unit Admitted: ICU





<SLY BARR - Last Filed: 02/10/17 22:41>





- Discharge


Clinical Impression: 


 Abdominal pain, generalized, AVNRT (AV denisa re-entry tachycardia), Dehydration





Diarrhea


Qualifiers:


 Diarrhea type: unspecified type Qualified Code(s): R19.7 - Diarrhea, 

unspecified





Nausea & vomiting


Qualifiers:


 Vomiting type: unspecified Vomiting Intractability: non-intractable Qualified 

Code(s): R11.2 - Nausea with vomiting, unspecified





Condition: Stable


Disposition: ADMITTED AS INPATIENT


Scribe Attestation: 





02/10/17 22:41


I personally performed the services described in the documentation, reviewed 

and edited the documentation which was dictated to the scribe in my presence, 

and it accurately records my words and actions. (SLY BARR)





Scribe Documentation





- Scribe


Written by Scribe:: Saray Flores 2/10/17 20:35


acting as scribe for :: Jorge





<SARAY FLORES - Last Filed: 02/10/17 21:52>

## 2017-02-10 NOTE — PDOC H&P
History of Present Illness


Admission Date/PCP: 


  02/10/17 21:50





Lesa Ramirez





Patient complains of: Nausea vomiting and diarrhea


History of Present Illness: 


KAIT QUARLES is a 56 year old  female with underlying 

hypertension, recent hospital stay for supraventricular tachycardia, which 

required cardioversion, hyperlipidemia, half pack a day smoker, hypothyroidism, 

anal cancer, status post radiation and chemotherapy, without surgery, currently 

in remission, with last chemotherapy 2-1/2 years ago, who presents to the 

emergency room for evaluation of above complaints.





Since her hospital stay at our facility approximately one month ago, she has 

had daily episodes of 15-20 small watery stools with occasional mucus and "pus.

"  No blood.  Intermittent mild cramping abdominal pain.





Over the last 24 hours, the abdominal pain has increased significantly.  

Shaking chills, although no fever per se.  Vomiting multiple times, with no 

blood or coffee ground emesis.





Was complaining of being short of breath upon arrival, although she was 100% on 

room air.  Underlying COPD.





Has not been able to take her normal medications last 24 hrs. because of the 

vomiting.  Blood pressure and pulse rate were noted be quite elevated upon 

arrival.  Was on a labetalol drip at one time, but this has been stopped due to 

adequate blood pressure control.





No unusual oral intake.  No friends or family with similar complaints.





No history of C. difficile.  Antibiotic use shortly after Kristina.





Actually scheduled to see Dr. Ramirez in the near future for a repeat colonoscopy.





Hospitalized the fifth through the 10th of last month with discharge diagnoses 

including AV denisa reentry tachycardia and acute kidney injury superimposed on 

chronic kidney disease.  Was cardioverted several times during that hospital 

stay.  Discharge summary has been reviewed.





Currently resting quietly, stating she does feel a bit better.  Less abdominal 

pain now.





Patient has been discussed with emergency room physician who evaluated the 

patient. .











Laboratory results are listed in [employer] and are reviewed. 








X-ray summary results are listed below, with full report(s) reviewed. .











EKG reviewed.  And compared to a tracing from the fifth of last month.











Social history/personal habits: .  No children.  On disability due to 

anal carcinoma.  One half pack of cigarettes per day.  No alcohol or illicit 

drug use.











Allergies/adverse reactions are listed in [employer] and are reviewed. 








Home medications are reviewed discussion with patient along with review of her 

recent discharge summary and have been reconciled by nursing staff in [facility]. 

Home medications initially autopopulated into [facility] may not accurately 

reflect patient's true medications, dosages, and/or frequencies. 











REVIEW OF SYSTEMS: 





Constitutional:  See history and present illness. 





Eyes: Wears glasses.   





ENT: No swallowing problems or complaints.   No hearing problems or complaints. 





Pulmonary:  See history and present illness. 





Cardiovascular: No current complaints, including chest pain.  





Gastrointestinal:  See history and present illness. 





Skin: No current complaints, including rashes. 





Hematologic:  Easy bruising. 





Neurologic: No current complaints, including numbness or tingling. 





Psychiatric: Mild  depression; denies suicidal or homicidal ideation. 





Endocrine: No current complaints, including polyuria. 





Genitourinary: No current complaints, including dysuria. 








PHYSICAL EXAMINATION:


 


5 feet 5 inches tall.  49.9 kg.  BMI 18.3 kg/m.  Blood pressure 136/88.  Pulse 

74 and regular.  99% saturation on room air.  Respirations are 19 and 

unlabored.  Temperature not recorded on the chart.





Female emergency room nursing tech Trista is present.





Thin somewhat chronically ill-appearing  female who appears a bit 

older than her stated age.  Appears not to feel very well.  Occasional mild 

shaking chills.  Pleasant awake alert and cooperative.  No other obvious 

distress other than mildly anxious.  No agitation.





Skin is warm and dry.  No grossly obvious evidence of rash in areas of skin 

examined.  No subcutaneous nodules palpated.  Patient does have a subcutaneous 

venous port on her right upper anterior medial chest wall.





ENT: Hearing grossly normal to normal conversation.  Tongue midline on 

protrusion pink and slightly tacky. 





Eyes: No scleral icterus.  Pupils equal and reactive to light at 4 mm.  Pink 

conjunctivae. 





Neck is supple and nontender to gentle active range of motion and palpation.  

Midline trachea.  No palpable thyroid nodule mass enlargement or tenderness. 





Lymphatic: No palpable cervical or clavicular nodes. 





Neck and lymphatic exams limited by patient body habitus. 





Psychiatric: Reasonable insight into acute and chronic medical issues.  

Oriented to time location and why here. 





Lungs: Auscultation reveals clear and equal breath sounds bilaterally.  No use 

of accessory respiratory muscles. 





Cardiovascular: Heart regular rate and rhythm, without gallop murmur or rub.  

No carotid or abdominal aortic bruits.  No ankle or pedal edema.  Faintly 

palpable dorsalis pedis pulses. 





Abdomen: soft, slightly distended with positive bowel sounds.  Unable to 

adequately evaluate abdomen for masses or organomegaly due to distention.





Mild diffuse abdominal discomfort, without evidence of guarding or peritoneal 

signs.





Extremities: Feet are warm and dry.  No calf tenderness to compression.  No 

grossly obvious visual evidence of calf swelling.  Gentle manipulation of lower 

extremities fails to reveal any obvious evidence of injury or instability to 

knees hips or ankles. 





Neurologic: Moves upper extremities grossly normally.  Patellar reflexes 

absent.  Absent Babinski.  Light touch is intact at feet.  Dorsiflexion and 

plantarflexion of feet 5 / 5 and symmetric. 











Past Medical History


Cardiac Medical History: Reports: Coronary Artery Disease, Myocardial 

Infarction - Questionable previous MI., Hyperlipidema, Hypertension, Other - SVT


   Denies: Congestive Heart Failure, DVT, Pulmonary Embolism


Pulmonary Medical History: Reports: Asthma - Childhood, Chronic Obstructive 

Pulmonary Disease (COPD)


   Denies: Bronchitis, Pneumonia


EENT Medical History: Reports: Eyes - Glasses


   Denies: Ears, Throat


Neurological Medical History: Reports: Seizures - Several years since last 

seizure


   Denies: Hemorrhagic CVA, Ischemic CVA


Endocrine Medical History: Reports: Diabetes Mellitus Type 2 - History of 

borderline diabetes., Hypothyroidism - S/P thyroidectomy


   Denies: Diabetes Mellitus Type 1, Hyperthyroidism


Renal/ Medical History: Reports: None


Malignancy Medical History: Reports: Other - History of anal cancer, status 

post radiation and chemotherapy treatment of same.  No surgery.


GI Medical History: Reports: Gastroesophageal Reflux Disease, Hepatitis - Hep C


Musculoskeltal Medical History: Reports: Arthritis, Gout


Skin Medical History: Reports: None


Psychiatric Medical History: Reports: Depression - Mild; denies suicidal or 

homicidal ideation., Tobacco Dependency


   Denies: Alcohol Dependency, General Anxiety Disorder, Substance Abuse


Hematology: Reports: Other - Easy bruising


   Denies: Anemia


Infectious Medical History: Reports: Hepatitis C


   Denies: Hepatitis B





Past Surgical History


Past Surgical History: Reports: Appendectomy, Carotid Endarterectomy, 

Orthopedic Surgery - L hip replacement 8/2012





Social History


Information Source: Patient, Emergency Med Personnel, [facility] Records


Smoking Status: Current Every Day Smoker


Frequency of Alcohol Use: None


Hx Recreational Drug Use: Yes


Drugs: Marijuana


Hx Prescription Drug Abuse: No





- Advance Directive


Resuscitation Status: Full Code


Surrogate healthcare decision maker:: 


Her carmen





Family History


Family History: None


Parental Family History Reviewed: Yes


Children Family History Reviewed: NA


Sibling(s) Family History Reviewed.: Yes





Medication/Allergy


Home Medications: 








Aspirin [Aspirin 81 mg Chewable Tablet] 81 mg PO DAILY 02/10/17 


Atorvastatin Calcium [Lipitor 20 mg Tablet] 20 mg PO QHS 02/10/17 


Ipratropium/Albuterol Sulfate [Combivent Respimat Inhal Spray] 1 puff IH QIDP 

PRN 02/10/17 


Levothyroxine Sodium [Synthroid 0.15 mg Tablet] 150 mcg PO DAILY 02/10/17 


Losartan Potassium [Cozaar 50 mg Tablet] 50 mg PO DAILY 02/10/17 


Metoprolol Tartrate [Lopressor 25 mg Tablet] 25 mg PO Q12 02/10/17 








Allergies/Adverse Reactions: 


 





ciprofloxacin [From Cipro] Allergy (Severe, Verified 12/30/16 08:46)


 


Penicillins Allergy (Severe, Verified 12/30/16 08:46)


 


cilastatin [From Primaxin IV] Allergy (Mild, Verified 01/07/17 13:13)


 


imipenem [From Primaxin IV] Allergy (Mild, Verified 01/07/17 13:13)


 


cephalexin monohydrate [From Keflex] Adverse Reaction (Verified 12/30/16 08:46)


 











Physical Exam


Vital Signs: 


 











Temp Pulse Resp BP Pulse Ox


 


       32 H  138/85 H  98 


 


       02/10/17 23:01  02/10/17 23:01  02/10/17 23:01








 Intake & Output











 02/09/17 02/10/17 02/11/17





 00:59 00:59 00:59


 


Weight   49.895 kg














Results


Impressions: 


 





Chest X-Ray  02/10/17 19:47


IMPRESSION:  COPD.  NO ACUTE RADIOGRAPHIC FINDING IN THE CHEST.


 








Abdomen/Pelvis CTA  02/10/17 19:55


IMPRESSION:  1. No evidence of aortic aneurysm or dissection.  2. 

Atherosclerosis, as above.  3. No pulmonary embolus.  4.  Chronic appearing 

left renal atrophy.  5.  No evidence of bowel obstruction or inflammatory 

process.  Trace free fluid in the pelvis is nonspecific but could reflect 

gastroenteritis.


 








Chest/Abdomen CTA  02/10/17 19:55


IMPRESSION:  1. No evidence of aortic aneurysm or dissection.  2. 

Atherosclerosis, as above.  3. No pulmonary embolus.  4.  Chronic appearing 

left renal atrophy.  5.  No evidence of bowel obstruction or inflammatory 

process.  Trace free fluid in the pelvis is nonspecific but could reflect 

gastroenteritis.


 














Assessment & Plan





- Diagnosis


(1) COPD (chronic obstructive pulmonary disease)


Qualifiers: 


     COPD type: unspecified COPD        Qualified Code(s): J44.9 - Chronic 

obstructive pulmonary disease, unspecified  


Is this a current diagnosis for this admission?: YesPlan: 


Clinically stable.Resume home medications as appropriate once these have been  

reviewed.  








(2) Tobacco dependency


Is this a current diagnosis for this admission?: YesPlan: 


When necessary nicotine patch.








(3) Hyperlipemia


Qualifiers: 


     Hyperlipidemia type: unspecified        Qualified Code(s): E78.5 - 

Hyperlipidemia, unspecified  


Is this a current diagnosis for this admission?: YesPlan: 


Resume home medications as appropriate once these have been  reviewed.  








(4) Hypertensive urgency


Is this a current diagnosis for this admission?: YesPlan: 





Likely due to inability to tolerate daily medication.  Responded nicely to 

labetalol drip which has been discontinued.  Gradual blood pressure control.  

Parameters are listed in admission orders.Resume home medications as 

appropriate once these have been  reviewed.  





I have strongly encouraged patient  to be careful getting out of bed , to avoid 

a fall with injury.





Knee high SCDs for DVT prophylaxis, along with subcutaneous  heparin.





Impression and plans were discussed with patient, who concurs.





Time spent in evaluation and management of patient: 66 minutes.








(5) Abdominal pain, generalized


Is this a current diagnosis for this admission?: YesPlan: 


Follow clinically.  When necessary pain medication.








(6) Nausea & vomiting


Qualifiers: 


     Vomiting type: unspecified     Vomiting Intractability: non-intractable   

     Qualified Code(s): R11.2 - Nausea with vomiting, unspecified  


Is this a current diagnosis for this admission?: YesPlan: 


Likely viral etiology.  When necessary Phenergan.








(7) Diarrhea


Qualifiers: 


     Diarrhea type: unspecified type     Qualified Code(s): R19.7 - Diarrhea, 

unspecified  


Is this a current diagnosis for this admission?: YesPlan: 


Stool for C. difficile along with culture and sensitivity.








(8) SVT (supraventricular tachycardia)


Is this a current diagnosis for this admission?: YesPlan: 


Likely due to patient not being able to tolerate home medications due to 

vomiting.  Rate nicely controlled at present.Resume home medications as 

appropriate once these have been  reviewed.  








(9) Hypothyroidism


Qualifiers: 


     Hypothyroidism type: unspecified        Qualified Code(s): E03.9 - 

Hypothyroidism, unspecified  


Is this a current diagnosis for this admission?: YesPlan: 


Resume home medications as appropriate once these have been  reviewed.  








(10) Hepatitis C


Qualifiers: 


     Viral hepatitis chronicity: chronic     Hepatic coma status: without 

hepatic coma        Qualified Code(s): B18.2 - Chronic viral hepatitis C  


Is this a current diagnosis for this admission?: Yes








- Inpatient Certification


Based on my medical assessment, after consideration of the patient's 

comorbidities, presenting symptoms, or acuity I expect that the services needed 

warrant INPATIENT care.: Yes


I certify that my determination is in accordance with my understanding of 

Medicare's requirements for reasonable and necessary INPATIENT services [42 CFR 

412.3e].: Yes


Medical Necessity: Need Close Monitoring Due to Risk of Patient Decompensation, 

Need For IV Fluids, Need For Continuous Telemetry Monitoring, Risk of Diagnosis 

Which Will Require Inpatient Eval/Care/Monitoring


Post Hospital Care: D/C or Transfer Summary

## 2017-02-11 LAB
ANION GAP SERPL CALC-SCNC: 18 MMOL/L (ref 5–19)
BARBITURATES UR QL SCN: NEGATIVE
BASOPHILS # BLD AUTO: 0 10^3/UL (ref 0–0.2)
BASOPHILS NFR BLD AUTO: 0.4 % (ref 0–2)
BUN SERPL-MCNC: 21 MG/DL (ref 7–20)
CALCIUM: 9.8 MG/DL (ref 8.4–10.2)
CHLORIDE SERPL-SCNC: 99 MMOL/L (ref 98–107)
CO2 SERPL-SCNC: 25 MMOL/L (ref 22–30)
CREAT SERPL-MCNC: 1.09 MG/DL (ref 0.52–1.25)
EOSINOPHIL # BLD AUTO: 0 10^3/UL (ref 0–0.6)
EOSINOPHIL NFR BLD AUTO: 0 % (ref 0–6)
ERYTHROCYTE [DISTWIDTH] IN BLOOD BY AUTOMATED COUNT: 14.2 % (ref 11.5–14)
GLUCOSE SERPL-MCNC: 143 MG/DL (ref 75–110)
HCT VFR BLD CALC: 45.3 % (ref 36–47)
HGB BLD-MCNC: 15.5 G/DL (ref 12–15.5)
HGB HCT DIFFERENCE: 1.2
LYMPHOCYTES # BLD AUTO: 0.9 10^3/UL (ref 0.5–4.7)
LYMPHOCYTES NFR BLD AUTO: 8.2 % (ref 13–45)
MCH RBC QN AUTO: 33.8 PG (ref 27–33.4)
MCHC RBC AUTO-ENTMCNC: 34.2 G/DL (ref 32–36)
MCV RBC AUTO: 99 FL (ref 80–97)
METHADONE UR QL SCN: NEGATIVE
MONOCYTES # BLD AUTO: 0.4 10^3/UL (ref 0.1–1.4)
MONOCYTES NFR BLD AUTO: 3.9 % (ref 3–13)
NEUTROPHILS # BLD AUTO: 9.4 10^3/UL (ref 1.7–8.2)
NEUTS SEG NFR BLD AUTO: 87.5 % (ref 42–78)
PCP UR QL SCN: NEGATIVE
POTASSIUM SERPL-SCNC: 3.9 MMOL/L (ref 3.6–5)
RBC # BLD AUTO: 4.59 10^6/UL (ref 3.72–5.28)
SODIUM SERPL-SCNC: 142.4 MMOL/L (ref 137–145)
URINE OPIATES LOW: (no result)
WBC # BLD AUTO: 10.7 10^3/UL (ref 4–10.5)

## 2017-02-11 RX ADMIN — LOSARTAN POTASSIUM SCH MG: 50 TABLET, FILM COATED ORAL at 10:04

## 2017-02-11 RX ADMIN — HEPARIN SODIUM SCH UNIT: 5000 INJECTION, SOLUTION INTRAVENOUS; SUBCUTANEOUS at 21:35

## 2017-02-11 RX ADMIN — MORPHINE SULFATE PRN MG: 10 INJECTION INTRAMUSCULAR; INTRAVENOUS; SUBCUTANEOUS at 23:08

## 2017-02-11 RX ADMIN — PROBIOTIC PRODUCT - TAB SCH MG: TAB at 17:10

## 2017-02-11 RX ADMIN — METOPROLOL TARTRATE SCH MG: 25 TABLET, FILM COATED ORAL at 21:34

## 2017-02-11 RX ADMIN — Medication SCH ML: at 21:40

## 2017-02-11 RX ADMIN — METOPROLOL TARTRATE SCH MG: 25 TABLET, FILM COATED ORAL at 10:03

## 2017-02-11 RX ADMIN — ASPIRIN SCH MG: 81 TABLET, CHEWABLE ORAL at 10:03

## 2017-02-11 RX ADMIN — LEVOTHYROXINE SODIUM SCH MG: 150 TABLET ORAL at 10:03

## 2017-02-11 RX ADMIN — HEPARIN SODIUM SCH UNIT: 5000 INJECTION, SOLUTION INTRAVENOUS; SUBCUTANEOUS at 10:02

## 2017-02-11 RX ADMIN — ENALAPRILAT PRN MG: 1.25 INJECTION, SOLUTION INTRAVENOUS at 04:16

## 2017-02-11 RX ADMIN — ATORVASTATIN CALCIUM SCH MG: 20 TABLET, FILM COATED ORAL at 21:34

## 2017-02-11 RX ADMIN — Medication SCH ML: at 09:58

## 2017-02-11 RX ADMIN — SODIUM CHLORIDE PRN ML: 9 INJECTION, SOLUTION INTRAVENOUS at 01:20

## 2017-02-11 RX ADMIN — MORPHINE SULFATE PRN MG: 10 INJECTION INTRAMUSCULAR; INTRAVENOUS; SUBCUTANEOUS at 10:13

## 2017-02-11 RX ADMIN — MORPHINE SULFATE PRN MG: 10 INJECTION INTRAMUSCULAR; INTRAVENOUS; SUBCUTANEOUS at 18:06

## 2017-02-11 RX ADMIN — ENALAPRILAT PRN MG: 1.25 INJECTION, SOLUTION INTRAVENOUS at 01:22

## 2017-02-11 RX ADMIN — Medication SCH ML: at 13:16

## 2017-02-11 RX ADMIN — MORPHINE SULFATE PRN MG: 10 INJECTION INTRAMUSCULAR; INTRAVENOUS; SUBCUTANEOUS at 02:29

## 2017-02-11 NOTE — PDOC PROGRESS REPORT
Subjective


Progress Note for:: 02/11/17


Subjective:: 


Nausea and vomiting improved.  Abdominal pain likewise improved.  Diarrhea on 

and off for a long time.  History of abdominal radiation when she had cancer in 

her colon.  Denies any chills or fever.  Denies intake of leftover foods.  

Denies recent antibiotic intake.  Denies exposure to someone sick with diarrhea.





Physical Exam


Vital Signs: 


 











Temp Pulse Resp BP Pulse Ox


 


 98.0 F   73   20   112/79   97 


 


 02/11/17 08:49  02/11/17 08:49  02/11/17 08:49  02/11/17 08:49  02/11/17 08:49








 Intake & Output











 02/10/17 02/11/17 02/12/17





 06:59 06:59 06:59


 


Weight   47.5 kg











General appearance: PRESENT: cooperative, mild distress - due to discomfort of 

abdominal pain


Head exam: PRESENT: normocephalic


Eye exam: PRESENT: EOMI, PERRLA.  ABSENT: scleral icterus


Mouth exam: PRESENT: moist, neck supple


Neck exam: ABSENT: JVD


Respiratory exam: PRESENT: clear to auscultation blake.  ABSENT: rhonchi, wheezes


Cardiovascular exam: PRESENT: RRR.  ABSENT: gallop


GI/Abdominal exam: PRESENT: hyperactive bowel sounds, soft, tenderness - Mild 

diffusely.  ABSENT: distended


Extremities exam: ABSENT: pedal edema


Neurological exam: PRESENT: alert, awake, oriented to situation


Skin exam: PRESENT: dry, warm.  ABSENT: cyanosis





Results


Laboratory Results: 


 





 02/11/17 07:40 





 02/11/17 07:40 





 











  02/11/17 02/11/17 02/11/17





  00:48 07:40 07:40


 


WBC   10.7 H 


 


RBC   4.59 


 


Hgb   15.5 


 


Hct   45.3 


 


MCV   99 H 


 


MCH   33.8 H 


 


MCHC   34.2 


 


RDW   14.2 H 


 


Plt Count   176 


 


Seg Neutrophils %   87.5 H 


 


Lymphocytes %   8.2 L 


 


Monocytes %   3.9 


 


Eosinophils %   0.0 


 


Basophils %   0.4 


 


Absolute Neutrophils   9.4 H 


 


Absolute Lymphocytes   0.9 


 


Absolute Monocytes   0.4 


 


Absolute Eosinophils   0.0 


 


Absolute Basophils   0.0 


 


Sodium    142.4


 


Potassium    3.9  D


 


Chloride    99


 


Carbon Dioxide    25


 


Anion Gap    18


 


BUN    21 H


 


Creatinine    1.09


 


Est GFR ( Amer)    > 60


 


Est GFR (Non-Af Amer)    52 L


 


Glucose    143 H


 


Lactic Acid  1.1  


 


Calcium    9.8








 











  02/11/17





  00:48


 


Troponin I  < 0.012











Impressions: 


 





Chest X-Ray  02/10/17 19:47


IMPRESSION:  COPD.  NO ACUTE RADIOGRAPHIC FINDING IN THE CHEST.


 








Abdomen/Pelvis CTA  02/10/17 19:55


IMPRESSION:  1. No evidence of aortic aneurysm or dissection.  2. 

Atherosclerosis, as above.  3. No pulmonary embolus.  4.  Chronic appearing 

left renal atrophy.  5.  No evidence of bowel obstruction or inflammatory 

process.  Trace free fluid in the pelvis is nonspecific but could reflect 

gastroenteritis.


 








Chest/Abdomen CTA  02/10/17 19:55


IMPRESSION:  1. No evidence of aortic aneurysm or dissection.  2. 

Atherosclerosis, as above.  3. No pulmonary embolus.  4.  Chronic appearing 

left renal atrophy.  5.  No evidence of bowel obstruction or inflammatory 

process.  Trace free fluid in the pelvis is nonspecific but could reflect 

gastroenteritis.


 














Assessment & Plan





- Diagnosis


(1) Abdominal pain, generalized


Is this a current diagnosis for this admission?: Yes





(2) Diarrhea


Qualifiers: 


     Diarrhea type: unspecified type     Qualified Code(s): R19.7 - Diarrhea, 

unspecified  


Is this a current diagnosis for this admission?: Yes





(3) Nausea & vomiting


Qualifiers: 


     Vomiting type: unspecified     Vomiting Intractability: non-intractable   

     Qualified Code(s): R11.2 - Nausea with vomiting, unspecified  


Is this a current diagnosis for this admission?: Yes





(4) SVT (supraventricular tachycardia)


Is this a current diagnosis for this admission?: Yes





(5) Hypertensive urgency


Is this a current diagnosis for this admission?: Yes





(6) COPD (chronic obstructive pulmonary disease)


Qualifiers: 


     COPD type: unspecified COPD        Qualified Code(s): J44.9 - Chronic 

obstructive pulmonary disease, unspecified  


Is this a current diagnosis for this admission?: Yes





(7) Hepatitis C


Qualifiers: 


     Viral hepatitis chronicity: chronic     Hepatic coma status: without 

hepatic coma        Qualified Code(s): B18.2 - Chronic viral hepatitis C  


Is this a current diagnosis for this admission?: Yes





(8) Hyperlipemia


Qualifiers: 


     Hyperlipidemia type: unspecified        Qualified Code(s): E78.5 - 

Hyperlipidemia, unspecified  


Is this a current diagnosis for this admission?: Yes





(9) Hypothyroidism


Qualifiers: 


     Hypothyroidism type: unspecified        Qualified Code(s): E03.9 - 

Hypothyroidism, unspecified  


Is this a current diagnosis for this admission?: Yes





(10) Gout


Qualifiers: 


     Gout site: unspecified site     Gout etiology: unspecified cause     

Chronicity: unspecified        Qualified Code(s): M10.9 - Gout, unspecified  


Is this a current diagnosis for this admission?: Yes





(11) Seizure disorder


Is this a current diagnosis for this admission?: Yes





(12) Coronary artery disease


Qualifiers: 


     Coronary Disease-Associated Artery/Lesion type: native artery     Native 

vs. transplanted heart: native heart     Associated angina: without angina     

   Qualified Code(s): I25.10 - Atherosclerotic heart disease of native coronary 

artery without angina pectoris  


Is this a current diagnosis for this admission?: Yes








- Time


Time Spent with patient: 25-34 minutes





- Plan Summary


Plan Summary: 


Continue IV hydration.  Patient is off labetalol intravenously.  We will 

continue to monitor blood pressure.  Obtain clostridium difficile toxin.  Begin 

Lactobacillus.  Continue supportive care.  Diarrhea may be related to radiation 

proctitis.

## 2017-02-11 NOTE — EKG REPORT
SEVERITY:- ABNORMAL ECG -

SINUS RHYTHM

FIRST DEGREE AV BLOCK

RIGHT AXIS DEVIATION

BORDERLINE PROLONGED QT INTERVAL

:

Confirmed by: Oseas Yousif MD 11-Feb-2017 08:42:43

## 2017-02-12 LAB
ANION GAP SERPL CALC-SCNC: 7 MMOL/L (ref 5–19)
BUN SERPL-MCNC: 19 MG/DL (ref 7–20)
CALCIUM: 8.1 MG/DL (ref 8.4–10.2)
CHLORIDE SERPL-SCNC: 105 MMOL/L (ref 98–107)
CO2 SERPL-SCNC: 25 MMOL/L (ref 22–30)
CREAT SERPL-MCNC: 1.06 MG/DL (ref 0.52–1.25)
ERYTHROCYTE [DISTWIDTH] IN BLOOD BY AUTOMATED COUNT: 14 % (ref 11.5–14)
GLUCOSE SERPL-MCNC: 87 MG/DL (ref 75–110)
HCT VFR BLD CALC: 36.2 % (ref 36–47)
HGB BLD-MCNC: 12.5 G/DL (ref 12–15.5)
HGB HCT DIFFERENCE: 1.3
MCH RBC QN AUTO: 34.2 PG (ref 27–33.4)
MCHC RBC AUTO-ENTMCNC: 34.4 G/DL (ref 32–36)
MCV RBC AUTO: 99 FL (ref 80–97)
POTASSIUM SERPL-SCNC: 3.6 MMOL/L (ref 3.6–5)
RBC # BLD AUTO: 3.65 10^6/UL (ref 3.72–5.28)
SODIUM SERPL-SCNC: 137 MMOL/L (ref 137–145)
WBC # BLD AUTO: 6 10^3/UL (ref 4–10.5)

## 2017-02-12 RX ADMIN — MORPHINE SULFATE PRN MG: 10 INJECTION INTRAMUSCULAR; INTRAVENOUS; SUBCUTANEOUS at 10:08

## 2017-02-12 RX ADMIN — HEPARIN SODIUM SCH UNIT: 5000 INJECTION, SOLUTION INTRAVENOUS; SUBCUTANEOUS at 22:19

## 2017-02-12 RX ADMIN — ASPIRIN SCH MG: 81 TABLET, CHEWABLE ORAL at 10:09

## 2017-02-12 RX ADMIN — LEVOTHYROXINE SODIUM SCH MG: 150 TABLET ORAL at 10:09

## 2017-02-12 RX ADMIN — SULFAMETHOXAZOLE AND TRIMETHOPRIM SCH TAB: 800; 160 TABLET ORAL at 17:21

## 2017-02-12 RX ADMIN — MORPHINE SULFATE PRN MG: 10 INJECTION INTRAMUSCULAR; INTRAVENOUS; SUBCUTANEOUS at 23:26

## 2017-02-12 RX ADMIN — Medication SCH ML: at 15:58

## 2017-02-12 RX ADMIN — METOPROLOL TARTRATE SCH MG: 25 TABLET, FILM COATED ORAL at 10:09

## 2017-02-12 RX ADMIN — PROBIOTIC PRODUCT - TAB SCH MG: TAB at 10:09

## 2017-02-12 RX ADMIN — SODIUM CHLORIDE PRN ML: 9 INJECTION, SOLUTION INTRAVENOUS at 18:27

## 2017-02-12 RX ADMIN — SODIUM CHLORIDE PRN ML: 9 INJECTION, SOLUTION INTRAVENOUS at 03:08

## 2017-02-12 RX ADMIN — HEPARIN SODIUM SCH UNIT: 5000 INJECTION, SOLUTION INTRAVENOUS; SUBCUTANEOUS at 10:08

## 2017-02-12 RX ADMIN — PROBIOTIC PRODUCT - TAB SCH MG: TAB at 17:21

## 2017-02-12 RX ADMIN — Medication SCH ML: at 05:04

## 2017-02-12 RX ADMIN — LOSARTAN POTASSIUM SCH MG: 50 TABLET, FILM COATED ORAL at 10:09

## 2017-02-12 RX ADMIN — Medication SCH ML: at 22:22

## 2017-02-12 RX ADMIN — ATORVASTATIN CALCIUM SCH MG: 20 TABLET, FILM COATED ORAL at 22:20

## 2017-02-12 RX ADMIN — METOPROLOL TARTRATE SCH MG: 25 TABLET, FILM COATED ORAL at 22:19

## 2017-02-12 NOTE — PDOC PROGRESS REPORT
Subjective


Progress Note for:: 02/12/17


Subjective:: 


Diarrhea has resolved.  Still with lower abdominal pain but less.  No more 

nausea or vomiting.  The patient reports urinary frequency as well as urgency 

but no dysuria.  Patient denies any melena or hematochezia.  No chills or fever.





Physical Exam


Vital Signs: 


 











Temp Pulse Resp BP Pulse Ox


 


 98.4 F   69   16   133/68 H  97 


 


 02/12/17 07:12  02/12/17 08:00  02/12/17 08:00  02/12/17 07:12  02/12/17 08:00








 Intake & Output











 02/11/17 02/12/17 02/13/17





 06:59 06:59 06:59


 


Intake Total  3653 


 


Output Total  100 


 


Balance  3553 


 


Weight  49.1 kg 











General appearance: PRESENT: no acute distress, cooperative


Head exam: PRESENT: normocephalic


Eye exam: PRESENT: EOMI


Mouth exam: PRESENT: moist, neck supple


Neck exam: ABSENT: JVD


Respiratory exam: PRESENT: clear to auscultation blake.  ABSENT: rhonchi, wheezes


Cardiovascular exam: PRESENT: RRR.  ABSENT: gallop


GI/Abdominal exam: PRESENT: soft, tenderness - On the hypogastric area.  ABSENT

: guarding


Extremities exam: ABSENT: pedal edema


Neurological exam: PRESENT: alert, awake, oriented to situation


Skin exam: PRESENT: dry, warm.  ABSENT: cyanosis





Results


Laboratory Results: 


 





 02/12/17 05:00 





 02/12/17 05:00 





 











  02/12/17 02/12/17





  05:00 05:00


 


WBC  6.0 


 


RBC  3.65 L 


 


Hgb  12.5  D 


 


Hct  36.2 


 


MCV  99 H 


 


MCH  34.2 H 


 


MCHC  34.4 


 


RDW  14.0 


 


Plt Count  135 L 


 


Sodium   137.0


 


Potassium   3.6


 


Chloride   105


 


Carbon Dioxide   25


 


Anion Gap   7


 


BUN   19


 


Creatinine   1.06


 


Est GFR ( Amer)   > 60


 


Est GFR (Non-Af Amer)   53 L


 


Glucose   87


 


Calcium   8.1 L








 











  02/11/17





  00:48


 


Troponin I  < 0.012











Impressions: 


 





Chest X-Ray  02/10/17 19:47


IMPRESSION:  COPD.  NO ACUTE RADIOGRAPHIC FINDING IN THE CHEST.


 








Abdomen/Pelvis CTA  02/10/17 19:55


IMPRESSION:  1. No evidence of aortic aneurysm or dissection.  2. 

Atherosclerosis, as above.  3. No pulmonary embolus.  4.  Chronic appearing 

left renal atrophy.  5.  No evidence of bowel obstruction or inflammatory 

process.  Trace free fluid in the pelvis is nonspecific but could reflect 

gastroenteritis.


 








Chest/Abdomen CTA  02/10/17 19:55


IMPRESSION:  1. No evidence of aortic aneurysm or dissection.  2. 

Atherosclerosis, as above.  3. No pulmonary embolus.  4.  Chronic appearing 

left renal atrophy.  5.  No evidence of bowel obstruction or inflammatory 

process.  Trace free fluid in the pelvis is nonspecific but could reflect 

gastroenteritis.


 














Assessment & Plan





- Diagnosis


(1) Abdominal pain, generalized


Is this a current diagnosis for this admission?: Yes





(2) Diarrhea


Qualifiers: 


     Diarrhea type: unspecified type     Qualified Code(s): R19.7 - Diarrhea, 

unspecified  


Is this a current diagnosis for this admission?: Yes





(3) Nausea & vomiting


Qualifiers: 


     Vomiting type: unspecified     Vomiting Intractability: non-intractable   

     Qualified Code(s): R11.2 - Nausea with vomiting, unspecified  


Is this a current diagnosis for this admission?: Yes





(4) SVT (supraventricular tachycardia)


Is this a current diagnosis for this admission?: Yes





(5) Hypertensive urgency


Is this a current diagnosis for this admission?: Yes





(6) COPD (chronic obstructive pulmonary disease)


Qualifiers: 


     COPD type: unspecified COPD        Qualified Code(s): J44.9 - Chronic 

obstructive pulmonary disease, unspecified  


Is this a current diagnosis for this admission?: Yes





(7) Hepatitis C


Qualifiers: 


     Viral hepatitis chronicity: chronic     Hepatic coma status: without 

hepatic coma        Qualified Code(s): B18.2 - Chronic viral hepatitis C  


Is this a current diagnosis for this admission?: Yes





(8) Hyperlipemia


Qualifiers: 


     Hyperlipidemia type: unspecified        Qualified Code(s): E78.5 - 

Hyperlipidemia, unspecified  


Is this a current diagnosis for this admission?: Yes





(9) Hypothyroidism


Qualifiers: 


     Hypothyroidism type: unspecified        Qualified Code(s): E03.9 - 

Hypothyroidism, unspecified  


Is this a current diagnosis for this admission?: Yes





(10) Gout


Qualifiers: 


     Gout site: unspecified site     Gout etiology: unspecified cause     

Chronicity: unspecified        Qualified Code(s): M10.9 - Gout, unspecified  


Is this a current diagnosis for this admission?: Yes





(11) Seizure disorder


Is this a current diagnosis for this admission?: Yes





(12) Coronary artery disease


Qualifiers: 


     Coronary Disease-Associated Artery/Lesion type: native artery     Native 

vs. transplanted heart: native heart     Associated angina: without angina     

   Qualified Code(s): I25.10 - Atherosclerotic heart disease of native coronary 

artery without angina pectoris  


Is this a current diagnosis for this admission?: Yes








- Time


Time Spent with patient: 15-24 minutes





- Plan Summary


Plan Summary: 


We will advance the patient's diet.  Patient's gastric symptoms may have been 

related to viral etiology or chronic condition brought about by radiation.  

Likewise patient has urinary tract symptoms unsure whether this is related to 

prior history of radiation although with recent diarrhea urinary tract 

infection is possible.  We will therefore give a dose of vancomycin and follow 

cultures.  I will put the patient on doxycycline.  Follow cultures.  Advance 

diet.

## 2017-02-13 RX ADMIN — Medication SCH ML: at 13:12

## 2017-02-13 RX ADMIN — Medication SCH ML: at 21:07

## 2017-02-13 RX ADMIN — LEVOTHYROXINE SODIUM SCH MG: 150 TABLET ORAL at 09:39

## 2017-02-13 RX ADMIN — HEPARIN SODIUM SCH UNIT: 5000 INJECTION, SOLUTION INTRAVENOUS; SUBCUTANEOUS at 21:06

## 2017-02-13 RX ADMIN — METOPROLOL TARTRATE SCH MG: 25 TABLET, FILM COATED ORAL at 21:06

## 2017-02-13 RX ADMIN — PROBIOTIC PRODUCT - TAB SCH MG: TAB at 09:39

## 2017-02-13 RX ADMIN — ASPIRIN SCH MG: 81 TABLET, CHEWABLE ORAL at 09:39

## 2017-02-13 RX ADMIN — ATORVASTATIN CALCIUM SCH MG: 20 TABLET, FILM COATED ORAL at 21:05

## 2017-02-13 RX ADMIN — METOPROLOL TARTRATE SCH MG: 25 TABLET, FILM COATED ORAL at 09:42

## 2017-02-13 RX ADMIN — LOSARTAN POTASSIUM SCH MG: 50 TABLET, FILM COATED ORAL at 09:41

## 2017-02-13 RX ADMIN — MORPHINE SULFATE PRN MG: 10 INJECTION INTRAMUSCULAR; INTRAVENOUS; SUBCUTANEOUS at 21:07

## 2017-02-13 RX ADMIN — Medication SCH ML: at 05:13

## 2017-02-13 RX ADMIN — PROBIOTIC PRODUCT - TAB SCH MG: TAB at 17:14

## 2017-02-13 RX ADMIN — ENALAPRILAT PRN MG: 1.25 INJECTION, SOLUTION INTRAVENOUS at 23:55

## 2017-02-13 RX ADMIN — HEPARIN SODIUM SCH UNIT: 5000 INJECTION, SOLUTION INTRAVENOUS; SUBCUTANEOUS at 09:37

## 2017-02-13 RX ADMIN — SULFAMETHOXAZOLE AND TRIMETHOPRIM SCH TAB: 800; 160 TABLET ORAL at 17:14

## 2017-02-13 RX ADMIN — SULFAMETHOXAZOLE AND TRIMETHOPRIM SCH TAB: 800; 160 TABLET ORAL at 09:39

## 2017-02-13 RX ADMIN — SODIUM CHLORIDE PRN ML: 9 INJECTION, SOLUTION INTRAVENOUS at 07:49

## 2017-02-13 NOTE — PDOC PROGRESS REPORT
Subjective


Progress Note for:: 02/13/17


Subjective:: 


The patient states to feel much better.


He still has some lower abdominal discomfort but denies any pain.  She did have 

a bowel movement.  She denies any palpitations.  Her blood pressure is well-

controlled.





Physical Exam


Vital Signs: 


 











Temp Pulse Resp BP Pulse Ox


 


 98.7 F   72   16   163/83 H  97 


 


 02/13/17 07:47  02/13/17 07:47  02/13/17 07:47  02/13/17 07:47  02/13/17 07:47








 Intake & Output











 02/12/17 02/13/17 02/14/17





 06:59 06:59 06:59


 


Intake Total 3653 6795 


 


Output Total 100 3150 


 


Balance 3553 3645 


 


Weight 49.1 kg 48.9 kg 











General appearance: PRESENT: no acute distress


Head exam: PRESENT: atraumatic


Eye exam: PRESENT: conjunctiva pink


Neck exam: PRESENT: carotid bruit.  ABSENT: JVD


Respiratory exam: PRESENT: rhonchi


Cardiovascular exam: PRESENT: RRR, +S1, +S2


Pulses: PRESENT: +1 pedal pulses bilateral


GI/Abdominal exam: PRESENT: soft, tenderness


Extremities exam: PRESENT: full ROM


Musculoskeletal exam: PRESENT: ambulatory


Neurological exam: PRESENT: alert, awake





Results


Laboratory Results: 


 





 02/12/17 05:00 





 02/12/17 05:00 





 











  02/11/17





  00:48


 


Troponin I  < 0.012











Impressions: 


 





Chest X-Ray  02/10/17 19:47


IMPRESSION:  COPD.  NO ACUTE RADIOGRAPHIC FINDING IN THE CHEST.


 








Abdomen/Pelvis CTA  02/10/17 19:55


IMPRESSION:  1. No evidence of aortic aneurysm or dissection.  2. 

Atherosclerosis, as above.  3. No pulmonary embolus.  4.  Chronic appearing 

left renal atrophy.  5.  No evidence of bowel obstruction or inflammatory 

process.  Trace free fluid in the pelvis is nonspecific but could reflect 

gastroenteritis.


 








Chest/Abdomen CTA  02/10/17 19:55


IMPRESSION:  1. No evidence of aortic aneurysm or dissection.  2. 

Atherosclerosis, as above.  3. No pulmonary embolus.  4.  Chronic appearing 

left renal atrophy.  5.  No evidence of bowel obstruction or inflammatory 

process.  Trace free fluid in the pelvis is nonspecific but could reflect 

gastroenteritis.


 














Assessment & Plan





- Diagnosis


(1) AVNRT (AV denisa re-entry tachycardia)


Is this a current diagnosis for this admission?: YesPlan: 


Controlled with medications.


Most probably related on admission to dehydration and inability to keep her 

medications down.








(2) Hypothyroidism


Qualifiers: 


     Hypothyroidism type: unspecified        Qualified Code(s): E03.9 - 

Hypothyroidism, unspecified  


Is this a current diagnosis for this admission?: YesPlan: 


Continue current medications








(3) Hypertensive urgency


Is this a current diagnosis for this admission?: YesPlan: 


Controlled with medications








(4) Coronary artery disease


Qualifiers: 


     Coronary Disease-Associated Artery/Lesion type: native artery     Native 

vs. transplanted heart: native heart     Associated angina: without angina     

   Qualified Code(s): I25.10 - Atherosclerotic heart disease of native coronary 

artery without angina pectoris  


Is this a current diagnosis for this admission?: YesPlan: 


Stable








(5) Abdominal pain, generalized


Is this a current diagnosis for this admission?: YesPlan: 


Improved with bowel movements.  Presently having formed stools








(6) SIRS (systemic inflammatory response syndrome)


Is this a current diagnosis for this admission?: YesPlan: 


Resolved with IV hydration and antibiotics.


Possibly related to urinary tract infection with Escherichia coli








(7) Urinary tract infection





Is this a current diagnosis for this admission?: YesPlan: 


Most probably related to Escherichia coli

## 2017-02-14 VITALS — DIASTOLIC BLOOD PRESSURE: 96 MMHG | SYSTOLIC BLOOD PRESSURE: 150 MMHG

## 2017-02-14 LAB
ALBUMIN SERPL-MCNC: 3.9 G/DL (ref 3.5–5)
ALP SERPL-CCNC: 61 U/L (ref 38–126)
ALT SERPL-CCNC: 25 U/L (ref 9–52)
ANION GAP SERPL CALC-SCNC: 8 MMOL/L (ref 5–19)
AST SERPL-CCNC: 20 U/L (ref 14–36)
BASOPHILS # BLD AUTO: 0 10^3/UL (ref 0–0.2)
BASOPHILS NFR BLD AUTO: 0.7 % (ref 0–2)
BILIRUB DIRECT SERPL-MCNC: 0 MG/DL (ref 0–0.3)
BILIRUB SERPL-MCNC: 0.7 MG/DL (ref 0.2–1.3)
BUN SERPL-MCNC: 14 MG/DL (ref 7–20)
CALCIUM: 9.2 MG/DL (ref 8.4–10.2)
CHLORIDE SERPL-SCNC: 105 MMOL/L (ref 98–107)
CO2 SERPL-SCNC: 24 MMOL/L (ref 22–30)
CREAT SERPL-MCNC: 1.19 MG/DL (ref 0.52–1.25)
EOSINOPHIL # BLD AUTO: 0.1 10^3/UL (ref 0–0.6)
EOSINOPHIL NFR BLD AUTO: 2.3 % (ref 0–6)
ERYTHROCYTE [DISTWIDTH] IN BLOOD BY AUTOMATED COUNT: 13.8 % (ref 11.5–14)
GLUCOSE SERPL-MCNC: 85 MG/DL (ref 75–110)
HCT VFR BLD CALC: 38.4 % (ref 36–47)
HGB BLD-MCNC: 13.2 G/DL (ref 12–15.5)
HGB HCT DIFFERENCE: 1.2
LYMPHOCYTES # BLD AUTO: 1.1 10^3/UL (ref 0.5–4.7)
LYMPHOCYTES NFR BLD AUTO: 23.2 % (ref 13–45)
MCH RBC QN AUTO: 33.9 PG (ref 27–33.4)
MCHC RBC AUTO-ENTMCNC: 34.3 G/DL (ref 32–36)
MCV RBC AUTO: 99 FL (ref 80–97)
MONOCYTES # BLD AUTO: 0.4 10^3/UL (ref 0.1–1.4)
MONOCYTES NFR BLD AUTO: 8.9 % (ref 3–13)
NEUTROPHILS # BLD AUTO: 3 10^3/UL (ref 1.7–8.2)
NEUTS SEG NFR BLD AUTO: 64.9 % (ref 42–78)
POTASSIUM SERPL-SCNC: 3.9 MMOL/L (ref 3.6–5)
PROT SERPL-MCNC: 6.5 G/DL (ref 6.3–8.2)
RBC # BLD AUTO: 3.89 10^6/UL (ref 3.72–5.28)
SODIUM SERPL-SCNC: 137.3 MMOL/L (ref 137–145)
WBC # BLD AUTO: 4.6 10^3/UL (ref 4–10.5)

## 2017-02-14 RX ADMIN — Medication SCH ML: at 05:16

## 2017-02-14 RX ADMIN — PROBIOTIC PRODUCT - TAB SCH MG: TAB at 09:54

## 2017-02-14 RX ADMIN — HEPARIN SODIUM SCH UNIT: 5000 INJECTION, SOLUTION INTRAVENOUS; SUBCUTANEOUS at 09:55

## 2017-02-14 RX ADMIN — METOPROLOL TARTRATE SCH MG: 25 TABLET, FILM COATED ORAL at 09:54

## 2017-02-14 RX ADMIN — LOSARTAN POTASSIUM SCH MG: 50 TABLET, FILM COATED ORAL at 09:54

## 2017-02-14 RX ADMIN — ASPIRIN SCH MG: 81 TABLET, CHEWABLE ORAL at 09:55

## 2017-02-14 RX ADMIN — SULFAMETHOXAZOLE AND TRIMETHOPRIM SCH TAB: 800; 160 TABLET ORAL at 09:55

## 2017-02-14 NOTE — PDOC DISCHARGE SUMMARY
General





- Admit/Disc Date/PCP


Admission Date/Primary Care Provider: 


  02/10/17 23:32





  DEVI CALLOWAY MD





Discharge Date: 02/14/17





- Discharge Diagnosis


(1) AVNRT (AV denisa re-entry tachycardia)


Is this a current diagnosis for this admission?: YesSummary: 


Resolved most probably related to intractable nausea and vomiting and inability 

to take her medications








(2) Hypothyroidism


Is this a current diagnosis for this admission?: YesSummary: 


Well-controlled with readjustment of the level thyroxine dose








(3) Hypertensive urgency


Is this a current diagnosis for this admission?: YesSummary: 


Controlled with medications








(4) Coronary artery disease


Is this a current diagnosis for this admission?: Yes





(5) Abdominal pain, generalized


Is this a current diagnosis for this admission?: Yes





(6) SIRS (systemic inflammatory response syndrome)


Is this a current diagnosis for this admission?: YesSummary: 


Resolved with rehydration and antibiotics








(7) Urinary tract infection


Is this a current diagnosis for this admission?: YesSummary: 


Improved with antibiotics











- Additional Information


Resuscitation Status: Full Code


Discharge Diet: As Tolerated


Discharge Activity: Activity As Tolerated


Home Medications: 








Aspirin [Aspirin 81 mg Chewable Tablet] 81 mg PO DAILY 02/10/17 


Atorvastatin Calcium [Lipitor 20 mg Tablet] 20 mg PO QHS 02/10/17 


Ipratropium/Albuterol Sulfate [Combivent Respimat Inhal Spray] 1 puff IH QIDP 

PRN 02/10/17 


Losartan Potassium [Cozaar 50 mg Tablet] 50 mg PO DAILY 02/10/17 


Metoprolol Tartrate [Lopressor 25 mg Tablet] 25 mg PO Q12 02/10/17 


Levothyroxine Sodium [Synthroid 0.15 mg Tablet] 0.175 mg PO DAILY #30 tablet 02/ 14/17 


Sulfamethoxazole/Trimethoprim [Septra-Ds 800-160 mg Tablet] 1 tab PO BID #10 

tablet 02/14/17 











History of Present Illness


History of Present Illness: 


KAIT QUARLES is a 56 year old female








Hospital Course


Hospital Course: 


The patient was admitted directly from the emergency room because of vomiting 

and rapid heart rate.  She has been giving IV fluids and medication to control 

her blood pressure and heart rate.  She has been given antihypertensives.


This is a recurrent episodes of symptomatology the patient gets when she 

becomes nauseous and started throwing up and not able to tolerate her 

medications.


Once she is rehydrated her symptoms improve and resolve.  She did quite well 

during the hospitalization she has received IV fluids and antibiotics.  Her 

potassium and magnesium have been supplemented.  Her labs have remained stable.

  She did not have any recurrence of nausea vomiting or diarrhea.





Physical Exam


Vital Signs: 


 











Temp Pulse Resp BP Pulse Ox


 


 97.9 F   68   22 H  116/74   99 


 


 02/14/17 04:22  02/14/17 06:42  02/14/17 04:22  02/14/17 04:22  02/14/17 04:22








 Intake & Output











 02/13/17 02/14/17 02/15/17





 06:59 06:59 06:59


 


Intake Total 6795 1610 


 


Output Total 3150 2400 


 


Balance 3645 -790 


 


Weight 48.9 kg 48.7 kg 











General appearance: PRESENT: no acute distress


Head exam: PRESENT: atraumatic


Eye exam: PRESENT: conjunctiva pink


Neck exam: PRESENT: carotid bruit


Respiratory exam: PRESENT: rhonchi


Cardiovascular exam: PRESENT: RRR, +S1, +S2


Pulses: PRESENT: +1 pedal pulses bilateral


Vascular exam: PRESENT: normal capillary refill


GI/Abdominal exam: PRESENT: normal bowel sounds, soft


Extremities exam: PRESENT: full ROM


Musculoskeletal exam: PRESENT: ambulatory


Neurological exam: PRESENT: alert, awake





Results


Laboratory Results: 


 





 02/14/17 05:20 





 02/14/17 05:20 





 











  02/13/17 02/14/17 02/14/17





  08:06 05:20 05:20


 


WBC   4.6 


 


RBC   3.89 


 


Hgb   13.2 


 


Hct   38.4 


 


MCV   99 H 


 


MCH   33.9 H 


 


MCHC   34.3 


 


RDW   13.8 


 


Plt Count   138 L 


 


Seg Neutrophils %   64.9 


 


Lymphocytes %   23.2 


 


Monocytes %   8.9 


 


Eosinophils %   2.3 


 


Basophils %   0.7 


 


Absolute Neutrophils   3.0 


 


Absolute Lymphocytes   1.1 


 


Absolute Monocytes   0.4 


 


Absolute Eosinophils   0.1 


 


Absolute Basophils   0.0 


 


Sodium    137.3


 


Potassium    3.9


 


Chloride    105


 


Carbon Dioxide    24


 


Anion Gap    8


 


BUN    14


 


Creatinine    1.19


 


Est GFR ( Amer)    57 L


 


Est GFR (Non-Af Amer)    47 L


 


Glucose    85


 


Calcium    9.2


 


Total Bilirubin    0.7


 


AST    20


 


ALT    25


 


Alkaline Phosphatase    61


 


Total Protein    6.5


 


Albumin    3.9


 


TSH   


 


Stool for White Cells  NO WBCs SEEN  














  02/14/17





  05:20


 


WBC 


 


RBC 


 


Hgb 


 


Hct 


 


MCV 


 


MCH 


 


MCHC 


 


RDW 


 


Plt Count 


 


Seg Neutrophils % 


 


Lymphocytes % 


 


Monocytes % 


 


Eosinophils % 


 


Basophils % 


 


Absolute Neutrophils 


 


Absolute Lymphocytes 


 


Absolute Monocytes 


 


Absolute Eosinophils 


 


Absolute Basophils 


 


Sodium 


 


Potassium 


 


Chloride 


 


Carbon Dioxide 


 


Anion Gap 


 


BUN 


 


Creatinine 


 


Est GFR ( Amer) 


 


Est GFR (Non-Af Amer) 


 


Glucose 


 


Calcium 


 


Total Bilirubin 


 


AST 


 


ALT 


 


Alkaline Phosphatase 


 


Total Protein 


 


Albumin 


 


TSH  14.10 H


 


Stool for White Cells 








 











  02/11/17





  00:48


 


Troponin I  < 0.012











Impressions: 


 





Chest X-Ray  02/10/17 19:47


IMPRESSION:  COPD.  NO ACUTE RADIOGRAPHIC FINDING IN THE CHEST.


 








Abdomen/Pelvis CTA  02/10/17 19:55


IMPRESSION:  1. No evidence of aortic aneurysm or dissection.  2. 

Atherosclerosis, as above.  3. No pulmonary embolus.  4.  Chronic appearing 

left renal atrophy.  5.  No evidence of bowel obstruction or inflammatory 

process.  Trace free fluid in the pelvis is nonspecific but could reflect 

gastroenteritis.


 








Chest/Abdomen CTA  02/10/17 19:55


IMPRESSION:  1. No evidence of aortic aneurysm or dissection.  2. 

Atherosclerosis, as above.  3. No pulmonary embolus.  4.  Chronic appearing 

left renal atrophy.  5.  No evidence of bowel obstruction or inflammatory 

process.  Trace free fluid in the pelvis is nonspecific but could reflect 

gastroenteritis.

## 2017-06-08 ENCOUNTER — HOSPITAL ENCOUNTER (OUTPATIENT)
Dept: HOSPITAL 62 - RAD | Age: 57
End: 2017-06-08
Attending: INTERNAL MEDICINE
Payer: MEDICARE

## 2017-06-08 DIAGNOSIS — J98.4: ICD-10-CM

## 2017-06-08 DIAGNOSIS — R91.1: ICD-10-CM

## 2017-06-08 DIAGNOSIS — Z12.31: Primary | ICD-10-CM

## 2017-06-08 PROCEDURE — G0202 SCR MAMMO BI INCL CAD: HCPCS

## 2017-06-08 PROCEDURE — 71250 CT THORAX DX C-: CPT

## 2017-06-08 PROCEDURE — 77067 SCR MAMMO BI INCL CAD: CPT

## 2017-06-08 NOTE — WOMENS IMAGING REPORT
EXAM DESCRIPTION:  BILAT SCREENING MAMMO W/CAD



COMPLETED DATE/TIME:  6/8/2017 11:42 am



REASON FOR STUDY:  ROUTINE SCREENING;Z12.31 R91.1  SOLITARY PULMONARY NODULE J98.4  OTHER DISORDERS O
F LUNG Z12.31  ENCNTR SCREEN MAMMOGRAM FOR MALIGNANT NEOPLASM OF JUANY



COMPARISON:  8/11/2014



TECHNIQUE:  Standard craniocaudal and mediolateral oblique views of each breast recorded using digita
l acquisition.



LIMITATIONS:  None.



FINDINGS:  No masses, calcifications or architectural distortion. No areas of suspicion.

Read with the assistance of CAD.

.[facility] - [facility]2 Cenova Version 1.3

.[facility] - R2 Cenova Version 1.3

.[facility] - R2 Cenova Version 2.4

.[facility] [place] - R2 Cenova Version 2.4

.[facility] - R2  Version 9.2



IMPRESSION:  NORMAL MAMMOGRAM.  BIRADS 1.



BREAST DENSITY:  c. The breasts are heterogeneously dense, which may obscure small masses.



BIRAD:  1 NEGATIVE



RECOMMENDATION:  ROUTINE SCREENING



COMMENT:  The patient has been notified of the results by letter per SA requirements. Additional no
tification policies are in place for contacting patient with suspicious or incomplete findings.

Quality ID #225: The American College of Radiology recommends an annual screening mammogram for women
 aged 40 years or over. This facility utilizes a reminder system to ensure that all patients receive 
reminder letters, and/or direct phone calls for appointments. This includes reminders for routine scr
eening mammograms, diagnostic mammograms, or other Breast Imaging Interventions when appropriate.  Th
is patient will be placed in the appropriate reminder system.

The American College of Radiology (ACR) has developed recommendations for screening MRI of the breast
s in certain patient populations, to be used in conjunction with mammography.  Breast MRI surveillanc
e may be appropriate for women with more than 20% lifetime risk of developing breast cancer  as deter
mined by genetic testing, significant family history of the disease, or history of mantle radiation f
or Hodgkins Disease.  ACR Practice Guidelines 2008.



TECHNICAL DOCUMENTATION:  FINDING NUMBER: (1)

ASSESSMENT: (1)

JOB ID:  [id number]

 2011 [employer]- All Rights Reserved

## 2017-06-08 NOTE — RADIOLOGY REPORT (SQ)
EXAM DESCRIPTION:  CT CHEST WITHOUT



COMPLETED DATE/TIME:  6/8/2017 8:28 am



REASON FOR STUDY:  SOLITARY PULMONARY NODULE (R91.1) R91.1  SOLITARY PULMONARY NODULE J98.4  OTHER DI
SORDERS OF LUNG Z12.31  ENCNTR SCREEN MAMMOGRAM FOR MALIGNANT NEOPLASM OF JUANY



COMPARISON:  2/10/2017 and 12/9/2016.



TECHNIQUE:  CT scan performed of the chest without intravenous contrast.  Images reviewed with lung, 
soft tissue and bone windows.  Reconstructed coronal and sagittal MPR images reviewed.  All images st
ored on PACS.

All CT scanners at this facility use dose modulation, iterative reconstruction, and/or weight based d
osing when appropriate to reduce radiation dose to as low as reasonably achievable (ALARA).

[facility]: Dose Right  [facility]: CareDose    [facility]: Dose Right    [facility]: Teradose 4D    [facility]: Smart Technologies



RADIATION DOSE:  3.19 mGy.



LIMITATIONS:  No technical limitations.



FINDINGS:  LUNGS AND PLEURA: Centrilobular and paraseptal emphysematous changes noted throughout both
 lungs, similar to prior study.  Scattered nodules are noted throughout the lungs, stable in size.  T
he largest cyst again noted in the left lower lobe measuring approximately 5 mm.  Some ground-glass d
ensities seen in both medial lower lobes which could represent areas of inflammation/ infection.

HILAR AND MEDIASTINAL STRUCTURES: No identified masses or abnormal nodes.  No obvious aneurysm.

HEART AND VASCULAR STRUCTURES: No aneurysm.  Calcific atherosclerosis of the great vessel origins.  N
o pericardial effusion.

UPPER ABDOMEN: No significant findings.  Left kidney atrophy, stable.  Limited exam.

THYROID AND OTHER SOFT TISSUES: No masses.  No adenopathy.

BONES: No significant finding.

HARDWARE: Right that IJ chest port

OTHER: No other significant findings.



IMPRESSION:  Multiple stable nodules throughout both lungs largest which measures 5 mm in the left lo
wer lobe.  No new nodules are identified.  Some ground-glass densities seen in the medial aspect of b
oth lower lobes likely related to infection/inflammation.  Stable centrilobular emphysematous disease
 in an paraseptal emphysematous disease.



TECHNICAL DOCUMENTATION:  JOB ID:  [id number]

Quality ID # 436: Final reports with documentation of one or more dose reduction techniques (e.g., Au
tomated exposure control, adjustment of the mA and/or kV according to patient size, use of iterative 
reconstruction technique)

 2011 [employer]- All Rights Reserved

## 2017-10-31 ENCOUNTER — HOSPITAL ENCOUNTER (OUTPATIENT)
Dept: HOSPITAL 62 - SP | Age: 57
End: 2017-10-31
Attending: INTERNAL MEDICINE
Payer: MEDICARE

## 2017-10-31 DIAGNOSIS — I73.9: Primary | ICD-10-CM

## 2017-10-31 DIAGNOSIS — R09.89: ICD-10-CM

## 2017-10-31 PROCEDURE — 93880 EXTRACRANIAL BILAT STUDY: CPT

## 2017-10-31 NOTE — RADIOLOGY REPORT (SQ)
EXAM DESCRIPTION:  CAROTID DOPPLER



COMPLETED DATE/TIME:  10/31/2017 2:06 pm



REASON FOR STUDY:  BRUIT R09.89  OTH SYMPTOMS AND SIGNS INVOLVING THE CIRC AND RESP SY



COMPARISON:  10/28/2016



TECHNIQUE:  Grayscale ultrasound, Doppler velocity and spectra, and color Doppler images acquired of 
the extra-cranial carotid and vertebral arteries. Images stored on PACS.



LIMITATIONS:  None.



FINDINGS:  RIGHT CAROTID

CCA Velocities: Within normal limits.

ICA Velocities

 Peak systolic 95cm/s.

 End diastolic 33cm/s.

Proximal ICA/CCA peak systolic ratio 1.4.

There is moderate plaque in the carotid bulb.  Normal waveforms.

LEFT CAROTID

CCA Velocities: 70 cm/s

ICA: Chronically occluded.

VERTEBRAL ARTERIES: Antegrade flow.  Normal waveforms.

SUBCLAVIAN ARTERIES: No finding.

OTHER: No other significant finding.



IMPRESSION:  No hemodynamically significant stenosis of right carotid system.  Chronic occlusion of l
eft internal carotid.



COMMENT:  Quality ID #195:  Velocity criteria are extrapolated from the diameter data as defined by t
he Society of Radiologists in Ultrasound Consensus Conference. Radiology 2003: 229; 340-346.



TECHNICAL DOCUMENTATION:  JOB ID:  [id number]

 2011 [employer]- All Rights Reserved

## 2017-12-18 ENCOUNTER — HOSPITAL ENCOUNTER (OUTPATIENT)
Dept: HOSPITAL 62 - RAD | Age: 57
End: 2017-12-18
Attending: INTERNAL MEDICINE
Payer: MEDICARE

## 2017-12-18 DIAGNOSIS — R91.1: ICD-10-CM

## 2017-12-18 DIAGNOSIS — J98.4: Primary | ICD-10-CM

## 2017-12-18 PROCEDURE — 71250 CT THORAX DX C-: CPT

## 2017-12-18 NOTE — RADIOLOGY REPORT (SQ)
EXAM DESCRIPTION:  CT CHEST WITHOUT



COMPLETED DATE/TIME:  12/18/2017 2:15 pm



REASON FOR STUDY:  R91.1 SOLITARY PULMONARY NODULE J98.4 OTHER DISORDERS OF LUNG R91.1  SOLITARY PULM
ONARY NODULE J98.4  OTHER DISORDERS OF LUNG



COMPARISON:  CT chest 11/2/2013, 6/26/2015, 2/10/2017, 6/8/2017

PET-CT 1/23/2015



TECHNIQUE:  CT scan performed of the chest without intravenous contrast.  Images reviewed with lung, 
soft tissue and bone windows.  Reconstructed coronal and sagittal MPR images reviewed.  All images st
ored on PACS.

All CT scanners at this facility use dose modulation, iterative reconstruction, and/or weight based d
osing when appropriate to reduce radiation dose to as low as reasonably achievable (ALARA).

[facility]: Dose Right  [facility]: CareDose    [facility]: Dose Right    [facility]: Teradose 4D    [facility]: Smart Technologies



RADIATION DOSE:  CT Rad equipment meets quality standard of care and radiation dose reduction techniq
ues were employed. CTDIvol: 3.6 mGy. DLP: 138 mGy-cm. mGy.



LIMITATIONS:  No technical limitations.



FINDINGS:  LUNGS AND PLEURA: Multiple noncalcified subpleural granulomas are present less than 4 mm i
n size.  These are unchanged compared to chest CT exams dating back to 11/2/2013.  These require no f
urther specific followup.

Mild changes of obstructive disease in the lungs, apical predominant.

No significant pulmonary fibrosis.  No acute infiltrates.  Airways are patent.  No pleural nodules or
 calcifications.

HILAR AND MEDIASTINAL STRUCTURES: No identified masses or abnormal nodes.  No obvious aneurysm.

HEART AND VASCULAR STRUCTURES: Spotty thoracic aortic calcification.  Minimal coronary artery calcifi
cation.  No pericardial effusion.  No cardiomegaly.

UPPER ABDOMEN: No significant findings.  Limited exam.

THYROID AND OTHER SOFT TISSUES: No masses.  No adenopathy.

BONES: No significant finding.

HARDWARE: Right-sided permanent central line tip superior vena cava

OTHER: No other significant findings.



IMPRESSION:  NO SIGNIFICANT FINDING ON NON-CONTRASTED CHEST CT.

Tiny bilateral lung parenchymal subpleural noncalcified granulomas unchanged from 11/2/2013.



TECHNICAL DOCUMENTATION:  JOB ID:  [id number]

Quality ID # 436: Final reports with documentation of one or more dose reduction techniques (e.g., Au
tomated exposure control, adjustment of the mA and/or kV according to patient size, use of iterative 
reconstruction technique)

 2011 [employer]- All Rights Reserved

## 2018-07-12 ENCOUNTER — HOSPITAL ENCOUNTER (INPATIENT)
Dept: HOSPITAL 62 - ER | Age: 58
LOS: 3 days | Discharge: HOME | DRG: 641 | End: 2018-07-15
Attending: INTERNAL MEDICINE | Admitting: INTERNAL MEDICINE
Payer: MEDICARE

## 2018-07-12 DIAGNOSIS — G40.909: ICD-10-CM

## 2018-07-12 DIAGNOSIS — R10.84: ICD-10-CM

## 2018-07-12 DIAGNOSIS — Z85.038: ICD-10-CM

## 2018-07-12 DIAGNOSIS — I10: ICD-10-CM

## 2018-07-12 DIAGNOSIS — K21.9: ICD-10-CM

## 2018-07-12 DIAGNOSIS — Z96.642: ICD-10-CM

## 2018-07-12 DIAGNOSIS — E87.70: ICD-10-CM

## 2018-07-12 DIAGNOSIS — B18.2: ICD-10-CM

## 2018-07-12 DIAGNOSIS — Z90.49: ICD-10-CM

## 2018-07-12 DIAGNOSIS — F17.200: ICD-10-CM

## 2018-07-12 DIAGNOSIS — M10.9: ICD-10-CM

## 2018-07-12 DIAGNOSIS — F32.9: ICD-10-CM

## 2018-07-12 DIAGNOSIS — M19.90: ICD-10-CM

## 2018-07-12 DIAGNOSIS — J44.9: ICD-10-CM

## 2018-07-12 DIAGNOSIS — R63.1: ICD-10-CM

## 2018-07-12 DIAGNOSIS — E87.6: ICD-10-CM

## 2018-07-12 DIAGNOSIS — E11.9: ICD-10-CM

## 2018-07-12 DIAGNOSIS — R11.2: ICD-10-CM

## 2018-07-12 DIAGNOSIS — E87.1: Primary | ICD-10-CM

## 2018-07-12 LAB
ADD MANUAL DIFF: NO
ALBUMIN SERPL-MCNC: 4.2 G/DL (ref 3.5–5)
ALP SERPL-CCNC: 59 U/L (ref 38–126)
ALT SERPL-CCNC: 27 U/L (ref 9–52)
ANION GAP SERPL CALC-SCNC: 14 MMOL/L (ref 5–19)
APPEARANCE UR: CLEAR
APTT PPP: YELLOW S
AST SERPL-CCNC: 32 U/L (ref 14–36)
BASOPHILS # BLD AUTO: 0.1 10^3/UL (ref 0–0.2)
BASOPHILS NFR BLD AUTO: 0.8 % (ref 0–2)
BILIRUB DIRECT SERPL-MCNC: 0.4 MG/DL (ref 0–0.4)
BILIRUB SERPL-MCNC: 0.4 MG/DL (ref 0.2–1.3)
BILIRUB UR QL STRIP: NEGATIVE
BUN SERPL-MCNC: 25 MG/DL (ref 7–20)
CALCIUM: 9.1 MG/DL (ref 8.4–10.2)
CHLORIDE SERPL-SCNC: 80 MMOL/L (ref 98–107)
CO2 SERPL-SCNC: 28 MMOL/L (ref 22–30)
EOSINOPHIL # BLD AUTO: 0.1 10^3/UL (ref 0–0.6)
EOSINOPHIL NFR BLD AUTO: 1.2 % (ref 0–6)
ERYTHROCYTE [DISTWIDTH] IN BLOOD BY AUTOMATED COUNT: 13.1 % (ref 11.5–14)
GLUCOSE SERPL-MCNC: 90 MG/DL (ref 75–110)
GLUCOSE UR STRIP-MCNC: NEGATIVE MG/DL
HCT VFR BLD CALC: 38.6 % (ref 36–47)
HGB BLD-MCNC: 13.8 G/DL (ref 12–15.5)
KETONES UR STRIP-MCNC: NEGATIVE MG/DL
LIPASE SERPL-CCNC: 207.3 U/L (ref 23–300)
LYMPHOCYTES # BLD AUTO: 1.6 10^3/UL (ref 0.5–4.7)
LYMPHOCYTES NFR BLD AUTO: 17.6 % (ref 13–45)
MCH RBC QN AUTO: 34.7 PG (ref 27–33.4)
MCHC RBC AUTO-ENTMCNC: 35.8 G/DL (ref 32–36)
MCV RBC AUTO: 97 FL (ref 80–97)
MONOCYTES # BLD AUTO: 0.5 10^3/UL (ref 0.1–1.4)
MONOCYTES NFR BLD AUTO: 5.6 % (ref 3–13)
NEUTROPHILS # BLD AUTO: 6.7 10^3/UL (ref 1.7–8.2)
NEUTS SEG NFR BLD AUTO: 74.8 % (ref 42–78)
NITRITE UR QL STRIP: NEGATIVE
PH UR STRIP: 5 [PH] (ref 5–9)
PLATELET # BLD: 186 10^3/UL (ref 150–450)
POTASSIUM SERPL-SCNC: 3.1 MMOL/L (ref 3.6–5)
PROT SERPL-MCNC: 7.1 G/DL (ref 6.3–8.2)
PROT UR STRIP-MCNC: NEGATIVE MG/DL
RBC # BLD AUTO: 3.99 10^6/UL (ref 3.72–5.28)
SODIUM SERPL-SCNC: 121.7 MMOL/L (ref 137–145)
SP GR UR STRIP: 1.01
TOTAL CELLS COUNTED % (AUTO): 100 %
UROBILINOGEN UR-MCNC: NEGATIVE MG/DL (ref ?–2)
WBC # BLD AUTO: 8.9 10^3/UL (ref 4–10.5)

## 2018-07-12 PROCEDURE — 81001 URINALYSIS AUTO W/SCOPE: CPT

## 2018-07-12 PROCEDURE — 80048 BASIC METABOLIC PNL TOTAL CA: CPT

## 2018-07-12 PROCEDURE — 80307 DRUG TEST PRSMV CHEM ANLYZR: CPT

## 2018-07-12 PROCEDURE — 96376 TX/PRO/DX INJ SAME DRUG ADON: CPT

## 2018-07-12 PROCEDURE — 36415 COLL VENOUS BLD VENIPUNCTURE: CPT

## 2018-07-12 PROCEDURE — 83880 ASSAY OF NATRIURETIC PEPTIDE: CPT

## 2018-07-12 PROCEDURE — 82962 GLUCOSE BLOOD TEST: CPT

## 2018-07-12 PROCEDURE — 85652 RBC SED RATE AUTOMATED: CPT

## 2018-07-12 PROCEDURE — 83690 ASSAY OF LIPASE: CPT

## 2018-07-12 PROCEDURE — 96366 THER/PROPH/DIAG IV INF ADDON: CPT

## 2018-07-12 PROCEDURE — 87040 BLOOD CULTURE FOR BACTERIA: CPT

## 2018-07-12 PROCEDURE — 99285 EMERGENCY DEPT VISIT HI MDM: CPT

## 2018-07-12 PROCEDURE — 84439 ASSAY OF FREE THYROXINE: CPT

## 2018-07-12 PROCEDURE — 96365 THER/PROPH/DIAG IV INF INIT: CPT

## 2018-07-12 PROCEDURE — 87086 URINE CULTURE/COLONY COUNT: CPT

## 2018-07-12 PROCEDURE — 96361 HYDRATE IV INFUSION ADD-ON: CPT

## 2018-07-12 PROCEDURE — 85025 COMPLETE CBC W/AUTO DIFF WBC: CPT

## 2018-07-12 PROCEDURE — 83735 ASSAY OF MAGNESIUM: CPT

## 2018-07-12 PROCEDURE — 96375 TX/PRO/DX INJ NEW DRUG ADDON: CPT

## 2018-07-12 PROCEDURE — 84443 ASSAY THYROID STIM HORMONE: CPT

## 2018-07-12 PROCEDURE — 93005 ELECTROCARDIOGRAM TRACING: CPT

## 2018-07-12 PROCEDURE — 93010 ELECTROCARDIOGRAM REPORT: CPT

## 2018-07-12 PROCEDURE — 74177 CT ABD & PELVIS W/CONTRAST: CPT

## 2018-07-12 PROCEDURE — 83605 ASSAY OF LACTIC ACID: CPT

## 2018-07-12 PROCEDURE — 80053 COMPREHEN METABOLIC PANEL: CPT

## 2018-07-12 RX ADMIN — POTASSIUM CHLORIDE SCH ML: 29.8 INJECTION, SOLUTION INTRAVENOUS at 22:24

## 2018-07-12 RX ADMIN — SODIUM CHLORIDE PRN ML: 9 INJECTION, SOLUTION INTRAVENOUS at 22:25

## 2018-07-12 NOTE — ER DOCUMENT REPORT
ED General





- General


Chief Complaint: Nausea/Vomiting


Stated Complaint: VOMITING AND WEAKNESS


Time Seen by Provider: 07/12/18 20:42


Mode of Arrival: Ambulatory


Information source: Patient


Notes: 





Patient is a 58-year-old female who presents with 4 days of nausea, vomiting 

and left flank pain.  Patient denies any dysuria however she reports that she 

is not urinating urinating very frequently..  Patient reports that she had a 

syncopal episode today when she attempted to stand up from the couch, patient 

reports she fell backwards into the couch.  Patient reports that she had a 

"fever" at home of 100.1 today.  Patient reports history of kidney infections 

as well as pancreatitis in the past.


TRAVEL OUTSIDE OF THE [place] IN LAST 30 DAYS: No





- Related Data


Allergies/Adverse Reactions: 


 





ciprofloxacin [From Cipro] Allergy (Severe, Verified 07/13/18 01:45)


 


Penicillins Allergy (Severe, Verified 07/13/18 01:45)


 


cilastatin [From Primaxin IV] Allergy (Mild, Verified 07/13/18 01:45)


 


imipenem [From Primaxin IV] Allergy (Mild, Verified 07/13/18 01:45)


 


cephalexin monohydrate [From Keflex] Adverse Reaction (Verified 07/13/18 01:45)


 











Past Medical History





- General


Information source: Patient





- Social History


Smoking Status: Current Every Day Smoker


Chew tobacco use (# tins/day): No


Frequency of alcohol use: Rare


Drug Abuse: None


Family History: None


Patient has suicidal ideation: No


Patient has homicidal ideation: No





- Past Medical History


Cardiac Medical History: Reports: Hx Coronary Artery Disease, Hx Heart Attack - 

Questionable previous MI., Hx Hypercholesterolemia, Hx Hypertension


   Denies: Hx Congestive Heart Failure, Hx DVT, Hx Pulmonary Embolism


Pulmonary Medical History: Reports: Hx Asthma - Childhood, Hx COPD


   Denies: Hx Bronchitis, Hx Pneumonia


Neurological Medical History: Reports: Hx Seizures - Several years since last 

seizure.  Denies: Hx Cerebrovascular Accident


Endocrine Medical History: Reports: Hx Diabetes Mellitus Type 2 - History of 

borderline diabetes., Hx Hypothyroidism - S/P thyroidectomy.  Denies: Hx 

Diabetes Mellitus Type 1, Hx Hyperthyroidism


Renal/ Medical History: Denies: Hx Peritoneal Dialysis


Malignancy Medical History: Reports: Hx Colorectal Cancer


GI Medical History: Reports: Hx Gastroesophageal Reflux Disease, Hx Hepatitis - 

Hep C


Musculoskeletal Medical History: Reports Hx Arthritis, Reports Hx Gout


Psychiatric Medical History: Reports: Hx Depression - Mild; denies suicidal or 

homicidal ideation.


Infectious Medical History: Reports: Hx Hepatitis - Hep C


Past Surgical History: Reports: Hx Appendectomy, Hx Carotid Endarterectomy, Hx 

Orthopedic Surgery - L hip replacement 8/2012, Hx Thyroid Surgery.  Denies: Hx 

Hysterectomy





- Immunizations


Immunizations up to date: Yes


Hx Diphtheria, Pertussis, Tetanus Vaccination: Yes


Hx Pneumococcal Vaccination: 01/01/10





Review of Systems





- Review of Systems


Constitutional: Malaise


EENT: No symptoms reported


Cardiovascular: No symptoms reported


Respiratory: No symptoms reported


Gastrointestinal: See HPI.  denies: Diarrhea, Constipation


Genitourinary: See HPI


Female Genitourinary: No symptoms reported


Musculoskeletal: No symptoms reported


Skin: No symptoms reported


Hematologic/Lymphatic: No symptoms reported


Neurological/Psychological: Lost consciousness





Physical Exam





- Vital signs


Vitals: 


 











Temp Pulse Resp BP Pulse Ox


 


 97.9 F   87   18   114/67   98 


 


 07/12/18 19:35  07/12/18 19:35  07/12/18 19:35  07/12/18 19:35  07/12/18 19:35














- Notes


Notes: 





PHYSICAL EXAMINATION:





GENERAL: Well-appearing, well-nourished and in no acute distress.





HEAD: Atraumatic, normocephalic.





EYES: Pupils equal round and reactive to light, extraocular movements intact, 

conjunctiva are normal.





ENT: Nares patent, oropharynx clear without exudates.  Moist mucous membranes.





NECK: Normal range of motion, supple without lymphadenopathy





LUNGS: Breath sounds equal bilaterally.  No rales, + rhonchi.  Mild inspiratory 

and expiratory wheezing.





HEART: Regular rate and rhythm without murmurs





ABDOMEN: Soft, nondistended abdomen.  Tenderness to palpation to left lower 

quadrant and left flank area.  No guarding, no rebound.  No masses appreciated.





Female : No CVA tenderness.





Musculoskeletal: Normal range of motion, no pitting or edema.  No cyanosis.





NEUROLOGICAL: Cranial nerves grossly intact.  Normal speech, normal gait.  

Normal sensory, motor exams





PSYCH: Normal mood, normal affect.





SKIN: Warm, Dry, normal turgor, no rashes or lesions noted.





Course





- Re-evaluation


Re-evalutation: 


Patient is a 58-year-old female who presents with 4 days of nausea, vomiting 

and left-sided flank pain.  Patient reports that she has been not been able to 

take any of her home medications.  Patient also reports syncopal episode today 

however she does not denies any injury.  Will access patient's port in order 

lab workup and start patient on antiemetics and give her a 1 L normal saline 

bolus.





CBC is unremarkable, lipase is normal, urinalysis is also unremarkable.  

Chemistry reveals a sodium of 121.7, potassium of 3.1, BUN of 25, creatinine 

1.49.  Mag is 1.9.  Will replace patient's potassium via IV and start patient 

on maintenance normal saline to correct her sodium.  Will consult hospitalist 

for admission.





Contacted Dr. Amos at 2230 who advises that he would like me to order a CT 

abdomen pelvis with oral and IV contrast to further evaluate her abdominal pain 

and cause of her vomiting.  Will also add on a urine drug screen.  Orthostatic 

vital signs are normal.  Patient has vomited one time since her arrival, 

patient treated with Reglan IV.





CT abdomen pelvis with no acute findings.  Repeat chemistry reveals sodium of 

122, potassium of 3.4.  Spoke with Dr. Amos again regarding admitting this 

patient and he agrees to admit patient at this time.  Patient will be placed in 

observation status on the medical floor.  Patient up-to-date on plan of care 

and patient is agreeable to same.  Patient's vital signs stable at time of 

admission.





- Vital Signs


Vital signs: 


 











Temp Pulse Resp BP Pulse Ox


 


 97.9 F   93   15   120/76   94 


 


 07/12/18 19:39  07/12/18 22:57  07/13/18 05:01  07/13/18 05:01  07/13/18 05:01














- Laboratory


Result Diagrams: 


 07/12/18 21:05





 07/13/18 03:19


Laboratory results interpreted by me: 


 











  07/12/18 07/12/18 07/13/18





  21:05 21:05 03:19


 


MCH  34.7 H  


 


Sodium   121.7 L  122.8 L


 


Potassium   3.1 L 


 


Chloride   80 L  89 L


 


BUN   25 H 


 


Creatinine   1.49 H  1.28 H


 


Est GFR ( Amer)   43 L  52 L


 


Est GFR (Non-Af Amer)   36 L  43 L


 


Calcium    7.7 L


 


Total Protein    5.7 L


 


Albumin    3.4 L














Discharge





- Discharge


Clinical Impression: 


 Hyponatremia, Hypokalemia





Vomiting


Qualifiers:


 Vomiting type: unspecified Vomiting Intractability: non-intractable Nausea 

presence: with nausea Qualified Code(s): R11.2 - Nausea with vomiting, 

unspecified





Condition: Stable


Disposition: ADMITTED AS OBSERVATION


Admitting Provider: Hospitalist


Unit Admitted: Medical Floor

## 2018-07-12 NOTE — EKG REPORT
SEVERITY:- ABNORMAL ECG -

SINUS RHYTHM

CONSIDER RVH W/ SECONDARY REPOL ABNORMALITY

CONSIDER LEFT VENTRICULAR HYPERTROPHY

ABNORMAL T, CONSIDER ISCHEMIA, LATERAL LEADS

PROLONGED QT INTERVAL

:

Confirmed by: Eve De Guzman MD 12-Jul-2018 20:31:43

## 2018-07-13 LAB
ALBUMIN SERPL-MCNC: 3.4 G/DL (ref 3.5–5)
ALP SERPL-CCNC: 49 U/L (ref 38–126)
ALT SERPL-CCNC: 23 U/L (ref 9–52)
ANION GAP SERPL CALC-SCNC: 10 MMOL/L (ref 5–19)
ANION GAP SERPL CALC-SCNC: 12 MMOL/L (ref 5–19)
AST SERPL-CCNC: 27 U/L (ref 14–36)
BARBITURATES UR QL SCN: NEGATIVE
BILIRUB DIRECT SERPL-MCNC: 0.3 MG/DL (ref 0–0.4)
BILIRUB SERPL-MCNC: 0.5 MG/DL (ref 0.2–1.3)
BUN SERPL-MCNC: 12 MG/DL (ref 7–20)
BUN SERPL-MCNC: 19 MG/DL (ref 7–20)
CALCIUM: 7.7 MG/DL (ref 8.4–10.2)
CALCIUM: 8.4 MG/DL (ref 8.4–10.2)
CHLORIDE SERPL-SCNC: 89 MMOL/L (ref 98–107)
CHLORIDE SERPL-SCNC: 96 MMOL/L (ref 98–107)
CO2 SERPL-SCNC: 24 MMOL/L (ref 22–30)
CO2 SERPL-SCNC: 24 MMOL/L (ref 22–30)
GLUCOSE SERPL-MCNC: 144 MG/DL (ref 75–110)
GLUCOSE SERPL-MCNC: 87 MG/DL (ref 75–110)
METHADONE UR QL SCN: NEGATIVE
PCP UR QL SCN: NEGATIVE
POTASSIUM SERPL-SCNC: 3.5 MMOL/L (ref 3.6–5)
POTASSIUM SERPL-SCNC: 3.6 MMOL/L (ref 3.6–5)
PROT SERPL-MCNC: 5.7 G/DL (ref 6.3–8.2)
SODIUM SERPL-SCNC: 122.8 MMOL/L (ref 137–145)
SODIUM SERPL-SCNC: 132.4 MMOL/L (ref 137–145)
URINE AMPHETAMINES SCREEN: NEGATIVE
URINE BENZODIAZEPINES SCREEN: NEGATIVE
URINE COCAINE SCREEN: NEGATIVE
URINE MARIJUANA (THC) SCREEN: (no result)

## 2018-07-13 RX ADMIN — POTASSIUM CHLORIDE SCH ML: 29.8 INJECTION, SOLUTION INTRAVENOUS at 01:54

## 2018-07-13 RX ADMIN — ATORVASTATIN CALCIUM SCH MG: 20 TABLET, FILM COATED ORAL at 21:41

## 2018-07-13 RX ADMIN — SODIUM CHLORIDE PRN ML: 9 INJECTION, SOLUTION INTRAVENOUS at 06:25

## 2018-07-13 RX ADMIN — METOPROLOL TARTRATE SCH MG: 25 TABLET, FILM COATED ORAL at 21:41

## 2018-07-13 RX ADMIN — METOPROLOL TARTRATE SCH MG: 25 TABLET, FILM COATED ORAL at 10:13

## 2018-07-13 RX ADMIN — LOSARTAN POTASSIUM SCH MG: 50 TABLET, FILM COATED ORAL at 10:13

## 2018-07-13 RX ADMIN — SODIUM CHLORIDE PRN ML: 9 INJECTION, SOLUTION INTRAVENOUS at 21:41

## 2018-07-13 RX ADMIN — ASPIRIN SCH MG: 81 TABLET, CHEWABLE ORAL at 10:13

## 2018-07-13 NOTE — RADIOLOGY REPORT (SQ)
EXAM DESCRIPTION: 



CT ABDOMEN PELVIS WITH IV CONTRAST



COMPLETED DATE/TME:  07/13/2018 00:00



CLINICAL HISTORY: LLQ pain. HX CA. BUN 25 CREAT 1.49



COMPARISON: 2/10/2017



TECHNIQUE: CT of the abdomen and pelvis performed following IV

administration of 54 mL of Isovue 3 7. Delayed images obtained.



DLP: 422.26 mGycm



FINDINGS: 



Lung Bases: The visualized  lung bases are clear.



Bones: No destructive bone lesions identified. Degenerative

change of the spine. Vertebral plasty.



Abdomen:



Liver: The liver has normal size and density. No intrahepatic

mass or biliary dilatation. 

Gallbladder: No calcified gallstones.

Spleen, Pancreas, and Adrenal Glands:  The spleen, pancreas, and

adrenal glands are unremarkable.

Kidneys:  Chronic left renal atrophy. No solid renal mass.

Bosniak class I bilateral renal cysts. No evidence of

hydronephrosis.  

Vasculature: Aortoiliac atherosclerosis. IVC is unremarkable. 

The portal vein is patent. The proximal visceral and renal

arteries are patent. 

Stomach:  Small hiatal hernia. 

Other:  No free intraperitoneal air.  No free fluid or

lymphadenopathy. 



Pelvis:



Bladder:  Urinary bladder is unremarkable. 

Bowel:  Mild wall thickening of the sigmoid colon. Scattered

diverticula of the colon. No pericholecystic inflammatory change.

Appendix:  Normal appendix.

Pelvis: Uterus is not enlarged.





IMPRESSION: 



1. Long segment wall thickening of the sigmoid colon. This may be

related to under distention however colitis of infectious or

inflammatory etiology could produce this appearance.



2. Scattered diverticula colon without definite evidence of

diverticulitis.



3. Chronic left renal atrophy.



This exam was performed according to our departmental

dose-optimization program, which includes automated exposure

control, adjustment of the mA and/or kV according to patient size

and/or use of iterative reconstruction technique.

## 2018-07-13 NOTE — PDOC H&P
History of Present Illness


Admission Date/PCP: 


  07/13/18 05:08





  CLAIRE CANDELARIA MD





Patient complains of: Nausea, vomiting and fatigue


History of Present Illness: 


KAIT QUARLES is a 58 year old female with a past medical history of COPD, 

tobacco dependence, seizure disorder, hypertension, hepatitis C and colon 

cancer in remission 12 months.  Patient presents with vague acute on chronic 

nausea and vomiting of gastric content and contradicts pain location and 

intensity depending on provider.  Currently unable to localize or describe more 

than vague formally stating 5 out of 5 intensity in the left flank.  She denies 

fever chills nausea vomiting.  She states she has syncopized upon standing but 

landing in a sofa without injury.  Evaluation of orthostatic vital signs are 

unremarkable in the emergency room.  She denies new medication she admits 

intermittent marijuana use.  In the emergency room she is found to have 

hyponatremia with hypokalemia and an unremarkable CT abdomen and pelvis.  She 

receives normal saline challenge with potassium repletion and referred to the 

hospitalist for admission.  There is no further episodes of vomiting.  She 

denies anxiety or depression








Past Medical History


Cardiac Medical History: Reports: Coronary Artery Disease, Myocardial 

Infarction - Questionable previous MI., Hyperlipidema, Hypertension


   Denies: Congestive Heart Failure, DVT, Pulmonary Embolism


Pulmonary Medical History: Reports: Asthma - Childhood, Chronic Obstructive 

Pulmonary Disease (COPD)


   Denies: Bronchitis, Pneumonia


Neurological Medical History: Reports: Seizures - Several years since last 

seizure


Endocrine Medical History: Reports: Diabetes Mellitus Type 2 - History of 

borderline diabetes., Hypothyroidism - S/P thyroidectomy


   Denies: Diabetes Mellitus Type 1, Hyperthyroidism


Malignancy Medical History: Reports: Colorectal Cancer


GI Medical History: Reports: Gastroesophageal Reflux Disease, Hepatitis - Hep C


Musculoskeltal Medical History: Reports: Arthritis, Gout


Psychiatric Medical History: Reports: Depression - Mild; denies suicidal or 

homicidal ideation., Substance Abuse, Tobacco Dependency


Hematology: 


   Denies: Anemia





Past Surgical History


Past Surgical History: Reports: Appendectomy, Carotid Endarterectomy, 

Orthopedic Surgery - L hip replacement 8/2012


   Denies: Hysterectomy





Social History


Information Source: Patient


Lives with: Alone


Smoking Status: Current Every Day Smoker


Frequency of Alcohol Use: None


Hx Recreational Drug Use: Yes


Drugs: Marijuana


Hx Prescription Drug Abuse: No





- Advance Directive


Resuscitation Status: Full Code





Family History


Family History: Hypertension


Parental Family History Reviewed: Yes


Children Family History Reviewed: Yes


Sibling(s) Family History Reviewed.: Yes





Medication/Allergy


Home Medications: 








Aspirin [Aspirin 81 mg Chewable Tablet] 81 mg PO DAILY 02/10/17 


Atorvastatin Calcium [Lipitor 20 mg Tablet] 20 mg PO QHS 02/10/17 


Ipratropium/Albuterol Sulfate [Combivent Respimat  Mcg] 1 puff IH QIDP 

PRN 02/10/17 


Losartan Potassium [Cozaar 50 mg Tablet] 50 mg PO DAILY 02/10/17 


Metoprolol Tartrate [Lopressor 25 mg Tablet] 25 mg PO Q12 02/10/17 


Levothyroxine Sodium [Synthroid 0.15 mg Tablet] 0.175 mg PO DAILY #30 tablet 02/ 14/17 


Sulfamethoxazole/Trimethoprim [Septra-Ds 800-160 mg Tablet] 1 tab PO BID #10 

tablet 02/14/17 








Allergies/Adverse Reactions: 


 





ciprofloxacin [From Cipro] Allergy (Severe, Verified 07/13/18 01:45)


 


Penicillins Allergy (Severe, Verified 07/13/18 01:45)


 


cilastatin [From Primaxin IV] Allergy (Mild, Verified 07/13/18 01:45)


 


imipenem [From Primaxin IV] Allergy (Mild, Verified 07/13/18 01:45)


 


cephalexin monohydrate [From Keflex] Adverse Reaction (Verified 07/13/18 01:45)


 











Review of Systems


ROS unobtainable: Due to mental status - Patient felt unreliable historian is 

answering affirmatively to all questions





Physical Exam


Vital Signs: 


 











Temp Pulse Resp BP Pulse Ox


 


 97.9 F   93   15   120/76   94 


 


 07/12/18 19:39  07/12/18 22:57  07/13/18 05:01  07/13/18 05:01  07/13/18 05:01











General appearance: PRESENT: no acute distress, well-developed, well-nourished


Head exam: PRESENT: atraumatic, normocephalic


Eye exam: PRESENT: conjunctiva pink, EOMI, PERRLA.  ABSENT: scleral icterus


Ear exam: PRESENT: normal external ear exam


Mouth exam: PRESENT: moist, tongue midline


Neck exam: ABSENT: carotid bruit, JVD, lymphadenopathy, thyromegaly


Respiratory exam: PRESENT: clear to auscultation blake.  ABSENT: rales, rhonchi, 

wheezes


Cardiovascular exam: PRESENT: RRR.  ABSENT: diastolic murmur, rubs, systolic 

murmur


Pulses: PRESENT: normal dorsalis pedis pul


Vascular exam: PRESENT: normal capillary refill


GI/Abdominal exam: PRESENT: normal bowel sounds, soft.  ABSENT: distended, 

guarding, mass, organolmegaly, rebound, tenderness


Rectal exam: PRESENT: deferred


Extremities exam: PRESENT: full ROM.  ABSENT: calf tenderness, clubbing, pedal 

edema


Neurological exam: PRESENT: alert, awake, oriented to person, oriented to place

, oriented to time, oriented to situation, CN II-XII grossly intact.  ABSENT: 

motor sensory deficit


Psychiatric exam: PRESENT: appropriate affect, normal mood.  ABSENT: homicidal 

ideation, suicidal ideation


Skin exam: PRESENT: dry, intact, warm.  ABSENT: cyanosis, rash





Results


Impressions: 


 





Abdomen/Pelvis CT  07/13/18 00:00


IMPRESSION: 


 


1. Long segment wall thickening of the sigmoid colon. This may be


related to under distention however colitis of infectious or


inflammatory etiology could produce this appearance.


 


2. Scattered diverticula colon without definite evidence of


diverticulitis.


 


3. Chronic left renal atrophy.


 


This exam was performed according to our departmental


dose-optimization program, which includes automated exposure


control, adjustment of the mA and/or kV according to patient size


and/or use of iterative reconstruction technique.


 














Assessment & Plan





- Diagnosis


(1) Hypokalemia


Is this a current diagnosis for this admission?: Yes   


Plan: 


Likely secondary to nausea and vomiting.  Repletion and reevaluation chemistry








(2) Hyponatremia


Is this a current diagnosis for this admission?: Yes   


Plan: 


Appears euvolemic, likely secondary to nausea with vomiting, IV saline challenge

, evaluate TSH and BNP.








(3) Abdominal pain, generalized


Is this a current diagnosis for this admission?: Yes   


Plan: 


Supportive care, CT unremarkable








(4) Hepatitis C


Qualifiers: 


   Viral hepatitis chronicity: chronic   Hepatic coma status: without hepatic 

coma   Qualified Code(s): B18.2 - Chronic viral hepatitis C   


Is this a current diagnosis for this admission?: Yes   


Plan: 


Denies treatment, possibly contributing to presentation of chronic fatigue 

abdominal pain and nausea.  Evaluate viral load








- Time


Time Spent: 50 to 70 Minutes





- Inpatient Certification


Medical Necessity: Need Close Monitoring Due to Risk of Patient Decompensation

## 2018-07-14 LAB
ANION GAP SERPL CALC-SCNC: 10 MMOL/L (ref 5–19)
BUN SERPL-MCNC: 8 MG/DL (ref 7–20)
CALCIUM: 8.6 MG/DL (ref 8.4–10.2)
CHLORIDE SERPL-SCNC: 105 MMOL/L (ref 98–107)
CO2 SERPL-SCNC: 26 MMOL/L (ref 22–30)
GLUCOSE SERPL-MCNC: 96 MG/DL (ref 75–110)
POTASSIUM SERPL-SCNC: 3.6 MMOL/L (ref 3.6–5)
SODIUM SERPL-SCNC: 140.8 MMOL/L (ref 137–145)

## 2018-07-14 RX ADMIN — LEVOTHYROXINE SODIUM SCH MG: 100 TABLET ORAL at 05:33

## 2018-07-14 RX ADMIN — LOSARTAN POTASSIUM SCH MG: 50 TABLET, FILM COATED ORAL at 11:04

## 2018-07-14 RX ADMIN — ATORVASTATIN CALCIUM SCH MG: 20 TABLET, FILM COATED ORAL at 22:11

## 2018-07-14 RX ADMIN — LEVOTHYROXINE SODIUM SCH MG: 75 TABLET ORAL at 05:33

## 2018-07-14 RX ADMIN — METOPROLOL TARTRATE SCH MG: 25 TABLET, FILM COATED ORAL at 11:03

## 2018-07-14 RX ADMIN — ASPIRIN SCH MG: 81 TABLET, CHEWABLE ORAL at 11:04

## 2018-07-14 NOTE — PDOC PROGRESS REPORT
Subjective


Progress Note for:: 07/14/18


Subjective:: 


No overnight events. Noted to have better sodium levels with NS IVF. However 

became more volume overloaded this AM. Developed SOB and lower extremity 

swelling. Started on O2 for SOB. Upon further discussion today with patient, 

she admits to "drinking a lot of water". This may be contributing to her low 

sodium levels. N/V better controlled. Diarrhea has slowed down as well. No 

other complaints. 


Reason For Visit: 


HYPONATREMIA








Physical Exam


Vital Signs: 


 











Temp Pulse Resp BP Pulse Ox


 


 97.9 F   90   16   138/89 H  97 


 


 07/14/18 11:18  07/14/18 11:18  07/14/18 11:18  07/14/18 11:18  07/14/18 11:18








 Intake & Output











 07/13/18 07/14/18 07/15/18





 06:59 06:59 06:59


 


Intake Total  1850 


 


Balance  1850 


 


Weight  57.2 kg 











General appearance: PRESENT: cooperative, mild distress - secondary to SOB, thin


Head exam: PRESENT: normocephalic


Mouth exam: PRESENT: moist


Respiratory exam: PRESENT: crackles - at lung bases, tachypnea


Cardiovascular exam: PRESENT: RRR.  ABSENT: tachycardia


GI/Abdominal exam: PRESENT: soft.  ABSENT: tenderness


Extremities exam: PRESENT: +1 edema


Neurological exam: PRESENT: alert, awake, CN II-XII grossly intact


Psychiatric exam: PRESENT: appropriate affect


Skin exam: PRESENT: intact





Results


Laboratory Results: 


 





 07/14/18 06:49 





 











  07/13/18 07/13/18 07/14/18





  17:20 17:20 06:49


 


Sodium   132.4 L  140.8


 


Potassium   3.5 L  3.6


 


Chloride   96 L  105


 


Carbon Dioxide   24  26


 


Anion Gap   12  10


 


BUN   12  8


 


Creatinine   0.96  0.96


 


Est GFR ( Amer)   > 60  > 60


 


Est GFR (Non-Af Amer)   59 L  59 L


 


Glucose   144 H  96


 


Calcium   8.4  8.6


 


Free T4  0.88  











Impressions: 


 





Abdomen/Pelvis CT  07/13/18 00:00


IMPRESSION: 


 


1. Long segment wall thickening of the sigmoid colon. This may be


related to under distention however colitis of infectious or


inflammatory etiology could produce this appearance.


 


2. Scattered diverticula colon without definite evidence of


diverticulitis.


 


3. Chronic left renal atrophy.


 


This exam was performed according to our departmental


dose-optimization program, which includes automated exposure


control, adjustment of the mA and/or kV according to patient size


and/or use of iterative reconstruction technique.


 














Assessment & Plan





- Diagnosis


(1) Primary polydipsia


Is this a current diagnosis for this admission?: Yes   


Plan: 


Likely cause of hyponatremia. Patient's Na improved after receiving NS IVF. 


- D/c-ed IVF


- Discussed importance of drinking less free water. Can consume beverages with 

electrolytes such as Gatorade 








(2) Hyponatremia


Is this a current diagnosis for this admission?: Yes   


Plan: 


Improved, see above








(3) Volume overload


Is this a current diagnosis for this admission?: Yes   


Plan: 


Likely iatrogenic from IVF fluids.


- Stopped IVF


- Received IV Lasix 40mg * 1 dose


- Decrease O2 requirement this afternoon. CTM








(4) Vomiting


Qualifiers: 


   Vomiting type: unspecified   Vomiting Intractability: non-intractable   

Nausea presence: with nausea   Qualified Code(s): R11.2 - Nausea with vomiting, 

unspecified   


Is this a current diagnosis for this admission?: Yes   


Plan: 


Improved, continue supportive care as needed








- Time


Time Spent with patient: Less than 15 minutes


Anticipated discharge: Home, Home with Homehealth


Within: within 24 hours

## 2018-07-15 VITALS — DIASTOLIC BLOOD PRESSURE: 72 MMHG | SYSTOLIC BLOOD PRESSURE: 114 MMHG

## 2018-07-15 LAB
ANION GAP SERPL CALC-SCNC: 10 MMOL/L (ref 5–19)
BUN SERPL-MCNC: 9 MG/DL (ref 7–20)
CALCIUM: 8.6 MG/DL (ref 8.4–10.2)
CHLORIDE SERPL-SCNC: 98 MMOL/L (ref 98–107)
CO2 SERPL-SCNC: 28 MMOL/L (ref 22–30)
GLUCOSE SERPL-MCNC: 106 MG/DL (ref 75–110)
POTASSIUM SERPL-SCNC: 3.5 MMOL/L (ref 3.6–5)
SODIUM SERPL-SCNC: 136.4 MMOL/L (ref 137–145)

## 2018-07-15 RX ADMIN — LEVOTHYROXINE SODIUM SCH MG: 100 TABLET ORAL at 06:49

## 2018-07-15 RX ADMIN — METOPROLOL TARTRATE SCH MG: 25 TABLET, FILM COATED ORAL at 00:37

## 2018-07-15 RX ADMIN — METOPROLOL TARTRATE SCH MG: 25 TABLET, FILM COATED ORAL at 10:11

## 2018-07-15 RX ADMIN — LEVOTHYROXINE SODIUM SCH MG: 75 TABLET ORAL at 06:49

## 2018-07-15 RX ADMIN — ASPIRIN SCH MG: 81 TABLET, CHEWABLE ORAL at 10:11

## 2018-07-15 RX ADMIN — LOSARTAN POTASSIUM SCH MG: 50 TABLET, FILM COATED ORAL at 10:10

## 2018-07-15 NOTE — PDOC DISCHARGE SUMMARY
General





- Admit/Disc Date/PCP


Admission Date/Primary Care Provider: 


  07/13/18 06:00





  CLAIRE CANDELARIA MD





Discharge Date: 07/15/18





- Discharge Diagnosis


(1) Primary polydipsia


Is this a current diagnosis for this admission?: Yes   


Summary: 


Likely cause of hyponatremia. Patient's Na improved after receiving NS IVF. 


- Discussed importance of drinking less free water. Can consume beverages with 

electrolytes such as Gatorade


- Pt understands and agreeable 








(2) Hyponatremia


Is this a current diagnosis for this admission?: Yes   


Summary: 


Improved, Na 136 on day of discharge


- Management per above








(3) Volume overload


Is this a current diagnosis for this admission?: Yes   


Summary: 


Iatrogenic from IVF fluids, d/c-ed IVF on 7/14 and received IV Lasix 40mg * 1 

dose.


- Resolution of overload symptoms. On RA at discharge











(4) Vomiting


Is this a current diagnosis for this admission?: Yes   


Summary: 


REsvolved


- Script given for Compazine 10mg q6 hours PRN (15 tabs)








- Additional Information


Resuscitation Status: Full Code


Discharge Diet: As Tolerated, Cardiac


Discharge Activity: Activity As Tolerated


Prescriptions: 


Prochlorperazine Maleate [Compazine 10 mg Tablet] 10 mg PO Q6 PRN 7 Days #15 

tablet


 PRN Reason: 


Home Medications: 








Aspirin [Aspirin 81 mg Chewable Tablet] 81 mg PO DAILY 02/10/17 


Ipratropium/Albuterol Sulfate [Combivent Respimat  Mcg] 1 puff IH QID 02/

10/17 


Losartan Potassium [Cozaar 50 mg Tablet] 50 mg PO DAILY 02/10/17 


Metoprolol Tartrate [Lopressor 25 mg Tablet] 12.5 mg PO Q12 02/10/17 


Levothyroxine Sodium [Synthroid] 175 mcg PO QAM 07/13/18 


Multivit-Min/Iron/Folic/Lutein [Centrum Silver Women Tablet] 1 each PO DAILYP 

PRN 07/13/18 


Prochlorperazine Maleate [Compazine 10 mg Tablet] 10 mg PO Q6 PRN 7 Days #15 

tablet 07/15/18 











History of Present Illness


History of Present Illness: 


KAIT QUARLES is a 58 year old female with a past medical history of COPD, 

tobacco dependence, seizure disorder, hypertension, hepatitis C and colon 

cancer in remission 12 months.  Patient presents with vague acute on chronic 

nausea and vomiting of gastric content and contradicts pain location and 

intensity depending on provider.  Currently unable to localize or describe more 

than vague formally stating 5 out of 5 intensity in the left flank.  She denies 

fever chills nausea vomiting.  She states she has syncopized upon standing but 

landing in a sofa without injury.  Evaluation of orthostatic vital signs are 

unremarkable in the emergency room.  She denies new medication she admits 

intermittent marijuana use.  In the emergency room she is found to have 

hyponatremia with hypokalemia and an unremarkable CT abdomen and pelvis.  She 

receives normal saline challenge with potassium repletion and referred to the 

hospitalist for admission.  There is no further episodes of vomiting.  She 

denies anxiety or depression








Physical Exam


Vital Signs: 


 











Temp Pulse Resp BP Pulse Ox


 


 97.8 F   90   16   114/72   98 


 


 07/15/18 10:28  07/15/18 10:28  07/15/18 10:28  07/15/18 10:28  07/15/18 10:28








 Intake & Output











 07/14/18 07/15/18 07/16/18





 06:59 06:59 06:59


 


Intake Total 1850 842 


 


Balance 1850 842 


 


Weight 57.2 kg 56.9 kg 











General appearance: PRESENT: no acute distress, cooperative, thin


Head exam: PRESENT: normocephalic


Mouth exam: PRESENT: moist


Respiratory exam: PRESENT: unlabored.  ABSENT: crackles


Cardiovascular exam: PRESENT: +S1, +S2.  ABSENT: tachycardia


GI/Abdominal exam: PRESENT: normal bowel sounds, soft.  ABSENT: tenderness


Musculoskeletal exam: PRESENT: ambulatory


Neurological exam: PRESENT: alert, awake


Psychiatric exam: PRESENT: appropriate affect


Skin exam: PRESENT: intact





Results


Laboratory Results: 


 





 07/15/18 06:34 





 











  07/15/18





  06:34


 


Sodium  136.4 L


 


Potassium  3.5 L


 


Chloride  98


 


Carbon Dioxide  28


 


Anion Gap  10


 


BUN  9


 


Creatinine  1.05


 


Est GFR ( Amer)  > 60


 


Est GFR (Non-Af Amer)  54 L


 


Glucose  106


 


Calcium  8.6











Impressions: 


 





Abdomen/Pelvis CT  07/13/18 00:00


IMPRESSION: 


 


1. Long segment wall thickening of the sigmoid colon. This may be


related to under distention however colitis of infectious or


inflammatory etiology could produce this appearance.


 


2. Scattered diverticula colon without definite evidence of


diverticulitis.


 


3. Chronic left renal atrophy.


 


This exam was performed according to our departmental


dose-optimization program, which includes automated exposure


control, adjustment of the mA and/or kV according to patient size


and/or use of iterative reconstruction technique.


 














Qualifiers





- *


PATIENT BEING DISCHARGED WITH ANY OF THE FOLLOWING DIAGNOSIS: No





Plan


Time Spent: Less than 30 Minutes